# Patient Record
Sex: MALE | Race: WHITE | NOT HISPANIC OR LATINO | Employment: OTHER | URBAN - METROPOLITAN AREA
[De-identification: names, ages, dates, MRNs, and addresses within clinical notes are randomized per-mention and may not be internally consistent; named-entity substitution may affect disease eponyms.]

---

## 2024-09-01 ENCOUNTER — APPOINTMENT (OUTPATIENT)
Dept: CT IMAGING | Facility: HOSPITAL | Age: 79
End: 2024-09-01
Payer: OTHER GOVERNMENT

## 2024-09-01 ENCOUNTER — APPOINTMENT (OUTPATIENT)
Dept: GENERAL RADIOLOGY | Facility: HOSPITAL | Age: 79
End: 2024-09-01
Payer: OTHER GOVERNMENT

## 2024-09-01 ENCOUNTER — HOSPITAL ENCOUNTER (OUTPATIENT)
Facility: HOSPITAL | Age: 79
Setting detail: OBSERVATION
Discharge: HOME | End: 2024-09-03
Attending: EMERGENCY MEDICINE | Admitting: STUDENT IN AN ORGANIZED HEALTH CARE EDUCATION/TRAINING PROGRAM
Payer: OTHER GOVERNMENT

## 2024-09-01 ENCOUNTER — APPOINTMENT (OUTPATIENT)
Dept: CARDIOLOGY | Facility: HOSPITAL | Age: 79
End: 2024-09-01
Payer: OTHER GOVERNMENT

## 2024-09-01 DIAGNOSIS — R53.1 GENERALIZED WEAKNESS: Primary | ICD-10-CM

## 2024-09-01 DIAGNOSIS — R00.1 SINUS BRADYCARDIA: ICD-10-CM

## 2024-09-01 DIAGNOSIS — E87.70 HYPERVOLEMIA, UNSPECIFIED HYPERVOLEMIA TYPE: ICD-10-CM

## 2024-09-01 DIAGNOSIS — R79.89 ELEVATED SERUM CREATININE: ICD-10-CM

## 2024-09-01 DIAGNOSIS — R79.89 ELEVATED TROPONIN: ICD-10-CM

## 2024-09-01 PROBLEM — N18.9 CKD (CHRONIC KIDNEY DISEASE): Status: ACTIVE | Noted: 2024-09-01

## 2024-09-01 PROBLEM — Z93.3 COLOSTOMY IN PLACE: Status: ACTIVE | Noted: 2024-09-01

## 2024-09-01 PROBLEM — I48.0 PAROXYSMAL A-FIB: Status: ACTIVE | Noted: 2024-09-01

## 2024-09-01 PROBLEM — K21.9 GERD (GASTROESOPHAGEAL REFLUX DISEASE): Status: ACTIVE | Noted: 2024-09-01

## 2024-09-01 PROBLEM — I50.9 CHF (CONGESTIVE HEART FAILURE): Status: ACTIVE | Noted: 2024-09-01

## 2024-09-01 PROBLEM — E11.9 TYPE 2 DIABETES MELLITUS: Status: ACTIVE | Noted: 2024-09-01

## 2024-09-01 PROBLEM — H35.30 MACULAR DEGENERATION: Status: ACTIVE | Noted: 2024-09-01

## 2024-09-01 LAB
ALBUMIN SERPL-MCNC: 3.9 G/DL (ref 3.5–5.2)
ALBUMIN/GLOB SERPL: 1.1 G/DL
ALP SERPL-CCNC: 110 U/L (ref 39–117)
ALT SERPL W P-5'-P-CCNC: 8 U/L (ref 1–41)
ANION GAP SERPL CALCULATED.3IONS-SCNC: 10 MMOL/L (ref 5–15)
ANION GAP SERPL CALCULATED.3IONS-SCNC: 12 MMOL/L (ref 5–15)
AORTIC DIMENSIONLESS INDEX: 0.7 (DI)
APTT PPP: 37.7 SECONDS (ref 22.7–35.4)
AST SERPL-CCNC: 19 U/L (ref 1–40)
BACTERIA UR QL AUTO: ABNORMAL /HPF
BASOPHILS # BLD AUTO: 0.05 10*3/MM3 (ref 0–0.2)
BASOPHILS NFR BLD AUTO: 0.9 % (ref 0–1.5)
BH CV ECHO MEAS - AI P1/2T: 667.7 MSEC
BH CV ECHO MEAS - AO MAX PG: 9.5 MMHG
BH CV ECHO MEAS - AO MEAN PG: 4 MMHG
BH CV ECHO MEAS - AO ROOT DIAM: 3.2 CM
BH CV ECHO MEAS - AO V2 MAX: 154 CM/SEC
BH CV ECHO MEAS - AO V2 VTI: 34.3 CM
BH CV ECHO MEAS - AVA(I,D): 2.31 CM2
BH CV ECHO MEAS - EDV(CUBED): 54.9 ML
BH CV ECHO MEAS - EDV(MOD-SP2): 102 ML
BH CV ECHO MEAS - EDV(MOD-SP4): 106 ML
BH CV ECHO MEAS - EF(MOD-BP): 55.5 %
BH CV ECHO MEAS - EF(MOD-SP2): 55.9 %
BH CV ECHO MEAS - EF(MOD-SP4): 57.5 %
BH CV ECHO MEAS - ESV(CUBED): 15.4 ML
BH CV ECHO MEAS - ESV(MOD-SP2): 45 ML
BH CV ECHO MEAS - ESV(MOD-SP4): 45 ML
BH CV ECHO MEAS - FS: 34.6 %
BH CV ECHO MEAS - IVS/LVPW: 1 CM
BH CV ECHO MEAS - IVSD: 1 CM
BH CV ECHO MEAS - LAT PEAK E' VEL: 5.8 CM/SEC
BH CV ECHO MEAS - LV DIASTOLIC VOL/BSA (35-75): 55.3 CM2
BH CV ECHO MEAS - LV MASS(C)D: 117.3 GRAMS
BH CV ECHO MEAS - LV MAX PG: 3.8 MMHG
BH CV ECHO MEAS - LV MEAN PG: 2 MMHG
BH CV ECHO MEAS - LV SYSTOLIC VOL/BSA (12-30): 23.5 CM2
BH CV ECHO MEAS - LV V1 MAX: 98.1 CM/SEC
BH CV ECHO MEAS - LV V1 VTI: 24.4 CM
BH CV ECHO MEAS - LVIDD: 3.8 CM
BH CV ECHO MEAS - LVIDS: 2.49 CM
BH CV ECHO MEAS - LVOT AREA: 3.3 CM2
BH CV ECHO MEAS - LVOT DIAM: 2.03 CM
BH CV ECHO MEAS - LVPWD: 1 CM
BH CV ECHO MEAS - MED PEAK E' VEL: 6.2 CM/SEC
BH CV ECHO MEAS - MR MAX PG: 22 MMHG
BH CV ECHO MEAS - MR MAX VEL: 234.3 CM/SEC
BH CV ECHO MEAS - MV A DUR: 0.08 SEC
BH CV ECHO MEAS - MV A MAX VEL: 40.7 CM/SEC
BH CV ECHO MEAS - MV DEC SLOPE: 519.1 CM/SEC2
BH CV ECHO MEAS - MV DEC TIME: 195 SEC
BH CV ECHO MEAS - MV E MAX VEL: 81.4 CM/SEC
BH CV ECHO MEAS - MV E/A: 2
BH CV ECHO MEAS - MV MAX PG: 4.5 MMHG
BH CV ECHO MEAS - MV MEAN PG: 1.21 MMHG
BH CV ECHO MEAS - MV P1/2T: 61.9 MSEC
BH CV ECHO MEAS - MV V2 VTI: 43.2 CM
BH CV ECHO MEAS - MVA(P1/2T): 3.6 CM2
BH CV ECHO MEAS - MVA(VTI): 1.84 CM2
BH CV ECHO MEAS - PA ACC TIME: 0.11 SEC
BH CV ECHO MEAS - PA V2 MAX: 64.3 CM/SEC
BH CV ECHO MEAS - PULM DIAS VEL: 30.4 CM/SEC
BH CV ECHO MEAS - PULM S/D: 1.2
BH CV ECHO MEAS - PULM SYS VEL: 36.4 CM/SEC
BH CV ECHO MEAS - RAP SYSTOLE: 3 MMHG
BH CV ECHO MEAS - RV MAX PG: 0.83 MMHG
BH CV ECHO MEAS - RV V1 MAX: 45.4 CM/SEC
BH CV ECHO MEAS - RV V1 VTI: 11.6 CM
BH CV ECHO MEAS - RVSP: 28 MMHG
BH CV ECHO MEAS - SV(LVOT): 79.3 ML
BH CV ECHO MEAS - SV(MOD-SP2): 57 ML
BH CV ECHO MEAS - SV(MOD-SP4): 61 ML
BH CV ECHO MEAS - SVI(LVOT): 41.4 ML/M2
BH CV ECHO MEAS - SVI(MOD-SP2): 29.7 ML/M2
BH CV ECHO MEAS - SVI(MOD-SP4): 31.8 ML/M2
BH CV ECHO MEAS - TAPSE (>1.6): 1.38 CM
BH CV ECHO MEAS - TR MAX PG: 25 MMHG
BH CV ECHO MEAS - TR MAX VEL: 249.8 CM/SEC
BH CV ECHO MEASUREMENTS AVERAGE E/E' RATIO: 13.57
BH CV XLRA - RV BASE: 3.8 CM
BH CV XLRA - TDI S': 7.6 CM/SEC
BILIRUB SERPL-MCNC: 1 MG/DL (ref 0–1.2)
BILIRUB UR QL STRIP: NEGATIVE
BUN SERPL-MCNC: 27 MG/DL (ref 8–23)
BUN SERPL-MCNC: 31 MG/DL (ref 8–23)
BUN/CREAT SERPL: 12.1 (ref 7–25)
BUN/CREAT SERPL: 12.5 (ref 7–25)
CALCIUM SPEC-SCNC: 9.3 MG/DL (ref 8.6–10.5)
CALCIUM SPEC-SCNC: 9.4 MG/DL (ref 8.6–10.5)
CHLORIDE SERPL-SCNC: 96 MMOL/L (ref 98–107)
CHLORIDE SERPL-SCNC: 97 MMOL/L (ref 98–107)
CLARITY UR: ABNORMAL
CO2 SERPL-SCNC: 30 MMOL/L (ref 22–29)
CO2 SERPL-SCNC: 30 MMOL/L (ref 22–29)
COLOR UR: YELLOW
CREAT SERPL-MCNC: 2.16 MG/DL (ref 0.76–1.27)
CREAT SERPL-MCNC: 2.56 MG/DL (ref 0.76–1.27)
CREAT UR-MCNC: 56.3 MG/DL
DEPRECATED RDW RBC AUTO: 48.9 FL (ref 37–54)
EGFRCR SERPLBLD CKD-EPI 2021: 25.2 ML/MIN/1.73
EGFRCR SERPLBLD CKD-EPI 2021: 31 ML/MIN/1.73
EOSINOPHIL # BLD AUTO: 0.09 10*3/MM3 (ref 0–0.4)
EOSINOPHIL NFR BLD AUTO: 1.6 % (ref 0.3–6.2)
ERYTHROCYTE [DISTWIDTH] IN BLOOD BY AUTOMATED COUNT: 16.2 % (ref 12.3–15.4)
FERRITIN SERPL-MCNC: 26.8 NG/ML (ref 30–400)
GEN 5 2HR TROPONIN T REFLEX: 110 NG/L
GLOBULIN UR ELPH-MCNC: 3.4 GM/DL
GLUCOSE BLDC GLUCOMTR-MCNC: 123 MG/DL (ref 70–130)
GLUCOSE BLDC GLUCOMTR-MCNC: 127 MG/DL (ref 70–130)
GLUCOSE BLDC GLUCOMTR-MCNC: 180 MG/DL (ref 70–130)
GLUCOSE BLDC GLUCOMTR-MCNC: 99 MG/DL (ref 70–130)
GLUCOSE SERPL-MCNC: 119 MG/DL (ref 65–99)
GLUCOSE SERPL-MCNC: 161 MG/DL (ref 65–99)
GLUCOSE UR STRIP-MCNC: NEGATIVE MG/DL
HCT VFR BLD AUTO: 27.1 % (ref 37.5–51)
HCT VFR BLD AUTO: 27.8 % (ref 37.5–51)
HGB BLD-MCNC: 8.1 G/DL (ref 13–17.7)
HGB BLD-MCNC: 8.2 G/DL (ref 13–17.7)
HGB UR QL STRIP.AUTO: ABNORMAL
HOLD SPECIMEN: NORMAL
HOLD SPECIMEN: NORMAL
HYALINE CASTS UR QL AUTO: ABNORMAL /LPF
IMM GRANULOCYTES # BLD AUTO: 0.03 10*3/MM3 (ref 0–0.05)
IMM GRANULOCYTES NFR BLD AUTO: 0.5 % (ref 0–0.5)
INR PPP: 2.32 (ref 0.9–1.1)
IRON 24H UR-MRATE: 22 MCG/DL (ref 59–158)
IRON SATN MFR SERPL: 5 % (ref 20–50)
KETONES UR QL STRIP: NEGATIVE
LEFT ATRIUM VOLUME INDEX: 42.7 ML/M2
LEUKOCYTE ESTERASE UR QL STRIP.AUTO: ABNORMAL
LYMPHOCYTES # BLD AUTO: 0.93 10*3/MM3 (ref 0.7–3.1)
LYMPHOCYTES NFR BLD AUTO: 16.1 % (ref 19.6–45.3)
MAGNESIUM SERPL-MCNC: 2.4 MG/DL (ref 1.6–2.4)
MCH RBC QN AUTO: 24.2 PG (ref 26.6–33)
MCHC RBC AUTO-ENTMCNC: 29.1 G/DL (ref 31.5–35.7)
MCV RBC AUTO: 83 FL (ref 79–97)
MONOCYTES # BLD AUTO: 0.58 10*3/MM3 (ref 0.1–0.9)
MONOCYTES NFR BLD AUTO: 10 % (ref 5–12)
NEUTROPHILS NFR BLD AUTO: 4.11 10*3/MM3 (ref 1.7–7)
NEUTROPHILS NFR BLD AUTO: 70.9 % (ref 42.7–76)
NITRITE UR QL STRIP: NEGATIVE
NRBC BLD AUTO-RTO: 0 /100 WBC (ref 0–0.2)
NT-PROBNP SERPL-MCNC: 2703 PG/ML (ref 0–1800)
PH UR STRIP.AUTO: 6.5 [PH] (ref 5–8)
PLATELET # BLD AUTO: 342 10*3/MM3 (ref 140–450)
PMV BLD AUTO: 9.1 FL (ref 6–12)
POTASSIUM SERPL-SCNC: 4 MMOL/L (ref 3.5–5.2)
POTASSIUM SERPL-SCNC: 4.3 MMOL/L (ref 3.5–5.2)
PROT ?TM UR-MCNC: 40.5 MG/DL
PROT SERPL-MCNC: 7.3 G/DL (ref 6–8.5)
PROT UR QL STRIP: ABNORMAL
PROT/CREAT UR: 719.4 MG/G CREA (ref 0–200)
PROTHROMBIN TIME: 25.8 SECONDS (ref 11.7–14.2)
QT INTERVAL: 463 MS
QT INTERVAL: 583 MS
QTC INTERVAL: 414 MS
QTC INTERVAL: 470 MS
RBC # BLD AUTO: 3.35 10*6/MM3 (ref 4.14–5.8)
RBC # UR STRIP: ABNORMAL /HPF
REF LAB TEST METHOD: ABNORMAL
SODIUM SERPL-SCNC: 137 MMOL/L (ref 136–145)
SODIUM SERPL-SCNC: 138 MMOL/L (ref 136–145)
SODIUM UR-SCNC: 77 MMOL/L
SP GR UR STRIP: 1.01 (ref 1–1.03)
SQUAMOUS #/AREA URNS HPF: ABNORMAL /HPF
T4 FREE SERPL-MCNC: 1.23 NG/DL (ref 0.92–1.68)
TIBC SERPL-MCNC: 448 MCG/DL (ref 298–536)
TRANSFERRIN SERPL-MCNC: 301 MG/DL (ref 200–360)
TROPONIN T DELTA: -18 NG/L
TROPONIN T SERPL HS-MCNC: 128 NG/L
TSH SERPL DL<=0.05 MIU/L-ACNC: 7.25 UIU/ML (ref 0.27–4.2)
UROBILINOGEN UR QL STRIP: ABNORMAL
UUN 24H UR-MCNC: 222 MG/DL
WBC # UR STRIP: ABNORMAL /HPF
WBC NRBC COR # BLD AUTO: 5.79 10*3/MM3 (ref 3.4–10.8)
WHOLE BLOOD HOLD COAG: NORMAL
WHOLE BLOOD HOLD SPECIMEN: NORMAL

## 2024-09-01 PROCEDURE — G0378 HOSPITAL OBSERVATION PER HR: HCPCS

## 2024-09-01 PROCEDURE — 25510000001 PERFLUTREN 6.52 MG/ML SUSPENSION 2 ML VIAL: Performed by: STUDENT IN AN ORGANIZED HEALTH CARE EDUCATION/TRAINING PROGRAM

## 2024-09-01 PROCEDURE — 99285 EMERGENCY DEPT VISIT HI MDM: CPT

## 2024-09-01 PROCEDURE — 87088 URINE BACTERIA CULTURE: CPT | Performed by: STUDENT IN AN ORGANIZED HEALTH CARE EDUCATION/TRAINING PROGRAM

## 2024-09-01 PROCEDURE — 81001 URINALYSIS AUTO W/SCOPE: CPT | Performed by: STUDENT IN AN ORGANIZED HEALTH CARE EDUCATION/TRAINING PROGRAM

## 2024-09-01 PROCEDURE — 83735 ASSAY OF MAGNESIUM: CPT | Performed by: PHYSICIAN ASSISTANT

## 2024-09-01 PROCEDURE — 87086 URINE CULTURE/COLONY COUNT: CPT | Performed by: STUDENT IN AN ORGANIZED HEALTH CARE EDUCATION/TRAINING PROGRAM

## 2024-09-01 PROCEDURE — 82948 REAGENT STRIP/BLOOD GLUCOSE: CPT

## 2024-09-01 PROCEDURE — 93005 ELECTROCARDIOGRAM TRACING: CPT | Performed by: EMERGENCY MEDICINE

## 2024-09-01 PROCEDURE — 84439 ASSAY OF FREE THYROXINE: CPT | Performed by: STUDENT IN AN ORGANIZED HEALTH CARE EDUCATION/TRAINING PROGRAM

## 2024-09-01 PROCEDURE — 93306 TTE W/DOPPLER COMPLETE: CPT

## 2024-09-01 PROCEDURE — 84484 ASSAY OF TROPONIN QUANT: CPT | Performed by: PHYSICIAN ASSISTANT

## 2024-09-01 PROCEDURE — 83880 ASSAY OF NATRIURETIC PEPTIDE: CPT | Performed by: PHYSICIAN ASSISTANT

## 2024-09-01 PROCEDURE — 84300 ASSAY OF URINE SODIUM: CPT | Performed by: STUDENT IN AN ORGANIZED HEALTH CARE EDUCATION/TRAINING PROGRAM

## 2024-09-01 PROCEDURE — 71045 X-RAY EXAM CHEST 1 VIEW: CPT

## 2024-09-01 PROCEDURE — 25810000003 SODIUM CHLORIDE 0.9 % SOLUTION: Performed by: STUDENT IN AN ORGANIZED HEALTH CARE EDUCATION/TRAINING PROGRAM

## 2024-09-01 PROCEDURE — 85730 THROMBOPLASTIN TIME PARTIAL: CPT | Performed by: PHYSICIAN ASSISTANT

## 2024-09-01 PROCEDURE — 82570 ASSAY OF URINE CREATININE: CPT | Performed by: STUDENT IN AN ORGANIZED HEALTH CARE EDUCATION/TRAINING PROGRAM

## 2024-09-01 PROCEDURE — 83540 ASSAY OF IRON: CPT | Performed by: STUDENT IN AN ORGANIZED HEALTH CARE EDUCATION/TRAINING PROGRAM

## 2024-09-01 PROCEDURE — 84466 ASSAY OF TRANSFERRIN: CPT | Performed by: STUDENT IN AN ORGANIZED HEALTH CARE EDUCATION/TRAINING PROGRAM

## 2024-09-01 PROCEDURE — 70450 CT HEAD/BRAIN W/O DYE: CPT

## 2024-09-01 PROCEDURE — 36415 COLL VENOUS BLD VENIPUNCTURE: CPT

## 2024-09-01 PROCEDURE — 84156 ASSAY OF PROTEIN URINE: CPT | Performed by: STUDENT IN AN ORGANIZED HEALTH CARE EDUCATION/TRAINING PROGRAM

## 2024-09-01 PROCEDURE — 85610 PROTHROMBIN TIME: CPT | Performed by: PHYSICIAN ASSISTANT

## 2024-09-01 PROCEDURE — 82728 ASSAY OF FERRITIN: CPT | Performed by: STUDENT IN AN ORGANIZED HEALTH CARE EDUCATION/TRAINING PROGRAM

## 2024-09-01 PROCEDURE — 85025 COMPLETE CBC W/AUTO DIFF WBC: CPT | Performed by: PHYSICIAN ASSISTANT

## 2024-09-01 PROCEDURE — 84443 ASSAY THYROID STIM HORMONE: CPT | Performed by: STUDENT IN AN ORGANIZED HEALTH CARE EDUCATION/TRAINING PROGRAM

## 2024-09-01 PROCEDURE — 93010 ELECTROCARDIOGRAM REPORT: CPT | Performed by: INTERNAL MEDICINE

## 2024-09-01 PROCEDURE — 84540 ASSAY OF URINE/UREA-N: CPT | Performed by: STUDENT IN AN ORGANIZED HEALTH CARE EDUCATION/TRAINING PROGRAM

## 2024-09-01 PROCEDURE — 93306 TTE W/DOPPLER COMPLETE: CPT | Performed by: INTERNAL MEDICINE

## 2024-09-01 PROCEDURE — 93005 ELECTROCARDIOGRAM TRACING: CPT | Performed by: STUDENT IN AN ORGANIZED HEALTH CARE EDUCATION/TRAINING PROGRAM

## 2024-09-01 PROCEDURE — 85018 HEMOGLOBIN: CPT | Performed by: STUDENT IN AN ORGANIZED HEALTH CARE EDUCATION/TRAINING PROGRAM

## 2024-09-01 PROCEDURE — 87186 SC STD MICRODIL/AGAR DIL: CPT | Performed by: STUDENT IN AN ORGANIZED HEALTH CARE EDUCATION/TRAINING PROGRAM

## 2024-09-01 PROCEDURE — 25010000002 NA FERRIC GLUC CPLX PER 12.5 MG: Performed by: STUDENT IN AN ORGANIZED HEALTH CARE EDUCATION/TRAINING PROGRAM

## 2024-09-01 PROCEDURE — 80053 COMPREHEN METABOLIC PANEL: CPT | Performed by: PHYSICIAN ASSISTANT

## 2024-09-01 PROCEDURE — 85014 HEMATOCRIT: CPT | Performed by: STUDENT IN AN ORGANIZED HEALTH CARE EDUCATION/TRAINING PROGRAM

## 2024-09-01 PROCEDURE — 93005 ELECTROCARDIOGRAM TRACING: CPT

## 2024-09-01 RX ORDER — GABAPENTIN 300 MG/1
300 CAPSULE ORAL 3 TIMES DAILY
Status: DISCONTINUED | OUTPATIENT
Start: 2024-09-01 | End: 2024-09-03 | Stop reason: HOSPADM

## 2024-09-01 RX ORDER — BISACODYL 5 MG/1
5 TABLET, DELAYED RELEASE ORAL DAILY PRN
Status: DISCONTINUED | OUTPATIENT
Start: 2024-09-01 | End: 2024-09-03 | Stop reason: HOSPADM

## 2024-09-01 RX ORDER — ACETAMINOPHEN 325 MG/1
650 TABLET ORAL ONCE
Status: COMPLETED | OUTPATIENT
Start: 2024-09-01 | End: 2024-09-01

## 2024-09-01 RX ORDER — FINASTERIDE 5 MG/1
5 TABLET, FILM COATED ORAL DAILY
Status: DISCONTINUED | OUTPATIENT
Start: 2024-09-01 | End: 2024-09-03 | Stop reason: HOSPADM

## 2024-09-01 RX ORDER — TRAMADOL HYDROCHLORIDE 50 MG/1
50 TABLET ORAL EVERY 8 HOURS PRN
COMMUNITY

## 2024-09-01 RX ORDER — AMOXICILLIN 250 MG
2 CAPSULE ORAL 2 TIMES DAILY PRN
Status: DISCONTINUED | OUTPATIENT
Start: 2024-09-01 | End: 2024-09-03 | Stop reason: HOSPADM

## 2024-09-01 RX ORDER — ONDANSETRON 4 MG/1
4 TABLET, ORALLY DISINTEGRATING ORAL EVERY 6 HOURS PRN
Status: DISCONTINUED | OUTPATIENT
Start: 2024-09-01 | End: 2024-09-03 | Stop reason: HOSPADM

## 2024-09-01 RX ORDER — SCOLOPAMINE TRANSDERMAL SYSTEM 1 MG/1
1 PATCH, EXTENDED RELEASE TRANSDERMAL
Status: DISCONTINUED | OUTPATIENT
Start: 2024-09-01 | End: 2024-09-03 | Stop reason: HOSPADM

## 2024-09-01 RX ORDER — ONDANSETRON 2 MG/ML
4 INJECTION INTRAMUSCULAR; INTRAVENOUS EVERY 6 HOURS PRN
Status: DISCONTINUED | OUTPATIENT
Start: 2024-09-01 | End: 2024-09-03 | Stop reason: HOSPADM

## 2024-09-01 RX ORDER — BUMETANIDE 2 MG/1
2 TABLET ORAL DAILY
Status: DISCONTINUED | OUTPATIENT
Start: 2024-09-01 | End: 2024-09-03 | Stop reason: HOSPADM

## 2024-09-01 RX ORDER — NICOTINE POLACRILEX 4 MG
15 LOZENGE BUCCAL
Status: DISCONTINUED | OUTPATIENT
Start: 2024-09-01 | End: 2024-09-03 | Stop reason: HOSPADM

## 2024-09-01 RX ORDER — GABAPENTIN 300 MG/1
300 CAPSULE ORAL 3 TIMES DAILY
COMMUNITY

## 2024-09-01 RX ORDER — FINASTERIDE 5 MG/1
5 TABLET, FILM COATED ORAL DAILY
COMMUNITY

## 2024-09-01 RX ORDER — FUROSEMIDE 10 MG/ML
40 INJECTION INTRAMUSCULAR; INTRAVENOUS ONCE
Status: DISCONTINUED | OUTPATIENT
Start: 2024-09-01 | End: 2024-09-01

## 2024-09-01 RX ORDER — BISACODYL 10 MG
10 SUPPOSITORY, RECTAL RECTAL DAILY PRN
Status: DISCONTINUED | OUTPATIENT
Start: 2024-09-01 | End: 2024-09-03 | Stop reason: HOSPADM

## 2024-09-01 RX ORDER — INSULIN LISPRO 100 [IU]/ML
2-7 INJECTION, SOLUTION INTRAVENOUS; SUBCUTANEOUS
Status: DISCONTINUED | OUTPATIENT
Start: 2024-09-01 | End: 2024-09-03 | Stop reason: HOSPADM

## 2024-09-01 RX ORDER — NITROGLYCERIN 0.4 MG/1
0.4 TABLET SUBLINGUAL
Status: DISCONTINUED | OUTPATIENT
Start: 2024-09-01 | End: 2024-09-03 | Stop reason: HOSPADM

## 2024-09-01 RX ORDER — FUROSEMIDE 10 MG/ML
80 INJECTION INTRAMUSCULAR; INTRAVENOUS ONCE
Status: DISCONTINUED | OUTPATIENT
Start: 2024-09-01 | End: 2024-09-01

## 2024-09-01 RX ORDER — POLYETHYLENE GLYCOL 3350 17 G/17G
17 POWDER, FOR SOLUTION ORAL DAILY PRN
Status: DISCONTINUED | OUTPATIENT
Start: 2024-09-01 | End: 2024-09-03 | Stop reason: HOSPADM

## 2024-09-01 RX ORDER — IBUPROFEN 600 MG/1
1 TABLET ORAL
Status: DISCONTINUED | OUTPATIENT
Start: 2024-09-01 | End: 2024-09-03 | Stop reason: HOSPADM

## 2024-09-01 RX ORDER — SODIUM CHLORIDE 0.9 % (FLUSH) 0.9 %
10 SYRINGE (ML) INJECTION AS NEEDED
Status: DISCONTINUED | OUTPATIENT
Start: 2024-09-01 | End: 2024-09-03 | Stop reason: HOSPADM

## 2024-09-01 RX ORDER — SCOLOPAMINE TRANSDERMAL SYSTEM 1 MG/1
1 PATCH, EXTENDED RELEASE TRANSDERMAL
COMMUNITY

## 2024-09-01 RX ORDER — CETIRIZINE HYDROCHLORIDE 10 MG/1
5 TABLET ORAL ONCE
Status: COMPLETED | OUTPATIENT
Start: 2024-09-01 | End: 2024-09-01

## 2024-09-01 RX ORDER — BUMETANIDE 2 MG/1
2 TABLET ORAL DAILY
COMMUNITY

## 2024-09-01 RX ORDER — TRAMADOL HYDROCHLORIDE 50 MG/1
50 TABLET ORAL EVERY 8 HOURS PRN
Status: DISCONTINUED | OUTPATIENT
Start: 2024-09-01 | End: 2024-09-03 | Stop reason: HOSPADM

## 2024-09-01 RX ORDER — DEXTROSE MONOHYDRATE 25 G/50ML
25 INJECTION, SOLUTION INTRAVENOUS
Status: DISCONTINUED | OUTPATIENT
Start: 2024-09-01 | End: 2024-09-03 | Stop reason: HOSPADM

## 2024-09-01 RX ADMIN — RIVAROXABAN 15 MG: 15 TABLET, FILM COATED ORAL at 18:05

## 2024-09-01 RX ADMIN — CETIRIZINE HYDROCHLORIDE 5 MG: 10 TABLET ORAL at 16:04

## 2024-09-01 RX ADMIN — GABAPENTIN 300 MG: 300 CAPSULE ORAL at 16:05

## 2024-09-01 RX ADMIN — PERFLUTREN 2 ML: 6.52 INJECTION, SUSPENSION INTRAVENOUS at 14:46

## 2024-09-01 RX ADMIN — ACETAMINOPHEN 325MG 650 MG: 325 TABLET ORAL at 16:04

## 2024-09-01 RX ADMIN — GABAPENTIN 300 MG: 300 CAPSULE ORAL at 21:11

## 2024-09-01 RX ADMIN — FINASTERIDE 5 MG: 5 TABLET, FILM COATED ORAL at 16:05

## 2024-09-01 RX ADMIN — SODIUM CHLORIDE 250 MG: 9 INJECTION, SOLUTION INTRAVENOUS at 16:29

## 2024-09-01 NOTE — H&P
Patient Name:  Huber Valdovinos  YOB: 1945  MRN:  [id number]  Admit Date:  9/1/2024  Patient Care Team:  Luis Slaughter MD as PCP - General (Family Medicine)      Subjective   History Present Illness     Chief Complaint   Patient presents with    Weakness - Generalized       Mr. Valdovinos is a 78 y.o. man with hypertension, type 2 diabetes with neuropathy, chronic afib on xarelto, CHF, GERD, CKD, macular degeneration, history of some kind of GI-percutaneous fistula, a history of diverticulitis requiring a partial colectomy with colostomy placement [facility] He is a very poor historian and gets upset whenever I ask him a question. He tells me that yesterday he was having trouble controlling the direction of his feet and that he was having reduced hand  strength. He also tells me that this has been ongoing for 8 months. He went to his girlfriend's apartment this morning (they live in the same building) and had her call 911. When EMS found him, he was noted to be bradycardic and so he was brought here. He was noted to have an elevated creatinine, but he does have CKD and doesn't know his baseline. He was also noted to have significant anemia with a hgb of 8, but he says that 6 months ago, his hgb was either 8 or 9. Currently he has no acute complaints. He says that his lower extremity swelling is at baseline and his shortness of breath is at baseline. He denies any chest discomfort or worsening cough. He does endorse some dysuria and palpitations.     History of Present Illness  Review of Systems   Constitutional:  Negative for chills, diaphoresis and fever.   HENT:  Negative for postnasal drip and rhinorrhea.    Eyes: Negative.    Respiratory:  Positive for shortness of breath. Negative for cough.    Cardiovascular:  Positive for palpitations. Negative for chest pain and leg swelling.   Gastrointestinal:  Negative for abdominal pain, constipation, diarrhea,  nausea and vomiting.   Endocrine: Negative.    Genitourinary:  Positive for dysuria, frequency and urgency. Negative for flank pain.   Musculoskeletal:  Negative for arthralgias and myalgias.   Skin:  Negative for rash and wound.   Allergic/Immunologic: Negative.    Neurological:  Positive for weakness. Negative for light-headedness and headaches.   Hematological: Negative.    Psychiatric/Behavioral:  Negative for confusion and decreased concentration.         Personal History     No past medical history on file.  No past surgical history on file.  No family history on file.  Social History     Tobacco Use    Smoking status: Every Day     Current packs/day: 0.50     Types: Cigarettes    Smokeless tobacco: Never   Substance Use Topics    Drug use: Never     No current facility-administered medications on file prior to encounter.     Current Outpatient Medications on File Prior to Encounter   Medication Sig Dispense Refill    bumetanide (BUMEX) 2 MG tablet Take 1 tablet by mouth Daily.      finasteride (PROSCAR) 5 MG tablet Take 1 tablet by mouth Daily.      gabapentin (NEURONTIN) 300 MG capsule Take 1 capsule by mouth 3 (Three) Times a Day.      rivaroxaban (XARELTO) 15 MG tablet Take 1 tablet by mouth Daily.      Scopolamine 1 MG/3DAYS patch Place 1 patch on the skin as directed by provider Every 72 (Seventy-Two) Hours.      tiZANidine (ZANAFLEX) 4 MG tablet Take 1 tablet by mouth At Night As Needed for Muscle Spasms.      traMADol (ULTRAM) 50 MG tablet Take 1 tablet by mouth Every 8 (Eight) Hours As Needed for Moderate Pain.       Allergies   Allergen Reactions    Oxycodone Itching       Objective    Objective     Vital Signs  Temp:  [97.9 °F (36.6 °C)-98 °F (36.7 °C)] 97.9 °F (36.6 °C)  Heart Rate:  [38-44] 44  Resp:  [22-23] 22  BP: ()/(56-58) 98/58  SpO2:  [98 %] 98 %  on   ;   Device (Oxygen Therapy): room air  Body mass index is 29.32 kg/m².    Physical Exam  Vitals and nursing note reviewed.    Constitutional:       General: He is not in acute distress.     Appearance: He is not toxic-appearing.   HENT:      Head: Normocephalic and atraumatic.      Mouth/Throat:      Mouth: Mucous membranes are moist.      Pharynx: Oropharynx is clear. No oropharyngeal exudate.   Eyes:      Extraocular Movements: Extraocular movements intact.      Conjunctiva/sclera: Conjunctivae normal.      Pupils: Pupils are equal, round, and reactive to light.   Cardiovascular:      Rate and Rhythm: Regular rhythm. Bradycardia present.      Heart sounds: Normal heart sounds.   Pulmonary:      Effort: Pulmonary effort is normal. No respiratory distress.      Breath sounds: Normal breath sounds. No wheezing, rhonchi or rales.   Abdominal:      General: Bowel sounds are normal. There is no distension.      Palpations: Abdomen is soft.      Tenderness: There is no abdominal tenderness. There is no guarding or rebound.      Comments: Large midline scar, left sided colostomy   Musculoskeletal:         General: No tenderness.      Cervical back: Normal range of motion and neck supple.      Right lower leg: Edema present.      Left lower leg: Edema present.   Skin:     General: Skin is warm and dry.      Findings: No erythema or rash.   Neurological:      General: No focal deficit present.      Mental Status: He is alert and oriented to person, place, and time.   Psychiatric:         Mood and Affect: Mood normal.         Behavior: Behavior normal.         Results Review:  I reviewed the patient's new clinical results.  I reviewed the patient's new imaging results and agree with the interpretation.  I reviewed the patient's other test results and agree with the interpretation  I personally viewed and interpreted the patient's EKG/Telemetry data  Discussed with ED provider.    Lab Results (last 24 hours)       Procedure Component Value Units Date/Time    CBC & Differential [[id number]]  (Abnormal) Collected: 09/01/24 0710    Specimen: Blood  Updated: 09/01/24 0759    Narrative:      The following orders were created for panel order CBC & Differential.  Procedure                               Abnormality         Status                     ---------                               -----------         ------                     CBC Auto Differential[[id number]]        Abnormal            Final result                 Please view results for these tests on the individual orders.    Comprehensive Metabolic Panel [[id number]]  (Abnormal) Collected: 09/01/24 0710    Specimen: Blood Updated: 09/01/24 0743     Glucose 161 mg/dL      BUN 27 mg/dL      Creatinine 2.16 mg/dL      Sodium 137 mmol/L      Potassium 4.3 mmol/L      Comment: Slight hemolysis detected by analyzer. Result may be falsely elevated.        Chloride 97 mmol/L      CO2 30.0 mmol/L      Calcium 9.4 mg/dL      Total Protein 7.3 g/dL      Albumin 3.9 g/dL      ALT (SGPT) 8 U/L      AST (SGOT) 19 U/L      Alkaline Phosphatase 110 U/L      Total Bilirubin 1.0 mg/dL      Globulin 3.4 gm/dL      A/G Ratio 1.1 g/dL      BUN/Creatinine Ratio 12.5     Anion Gap 10.0 mmol/L      eGFR 31.0 mL/min/1.73     Narrative:      GFR Normal >60  Chronic Kidney Disease <60  Kidney Failure <15    The GFR formula is only valid for adults with stable renal function between ages 18 and 70.    Protime-INR [[id number]]  (Abnormal) Collected: 09/01/24 0710    Specimen: Blood Updated: 09/01/24 0730     Protime 25.8 Seconds      INR 2.32    aPTT [[id number]]  (Abnormal) Collected: 09/01/24 0710    Specimen: Blood Updated: 09/01/24 0730     PTT 37.7 seconds     BNP [[id number]]  (Abnormal) Collected: 09/01/24 0710    Specimen: Blood Updated: 09/01/24 0743     proBNP 2,703.0 pg/mL     Narrative:      This assay is used as an aid in the diagnosis of individuals suspected of having heart failure. It can be used as an aid in the diagnosis of acute decompensated heart failure (ADHF) in patients presenting with signs and symptoms of ADHF  to the emergency department (ED). In addition, NT-proBNP of <300 pg/mL indicates ADHF is not likely.    Age Range Result Interpretation  NT-proBNP Concentration (pg/mL:      <50             Positive            >450                   Gray                 300-450                    Negative             <300    50-75           Positive            >900                  Gray                300-900                  Negative            <300      >75             Positive            >1800                  Gray                300-1800                  Negative            <300    Single High Sensitivity Troponin T [[id number]]  (Abnormal) Collected: 09/01/24 0710    Specimen: Blood Updated: 09/01/24 0800     HS Troponin T 128 ng/L     Narrative:      High Sensitive Troponin T Reference Range:  <14.0 ng/L- Negative Female for AMI  <22.0 ng/L- Negative Male for AMI  >=14 - Abnormal Female indicating possible myocardial injury.  >=22 - Abnormal Male indicating possible myocardial injury.   Clinicians would have to utilize clinical acumen, EKG, Troponin, and serial changes to determine if it is an Acute Myocardial Infarction or myocardial injury due to an underlying chronic condition.         Magnesium [[id number]]  (Normal) Collected: 09/01/24 0710    Specimen: Blood Updated: 09/01/24 0743     Magnesium 2.4 mg/dL     CBC Auto Differential [[id number]]  (Abnormal) Collected: 09/01/24 0710    Specimen: Blood Updated: 09/01/24 0759     WBC 5.79 10*3/mm3      RBC 3.35 10*6/mm3      Hemoglobin 8.1 g/dL      Hematocrit 27.8 %      MCV 83.0 fL      MCH 24.2 pg      MCHC 29.1 g/dL      RDW 16.2 %      RDW-SD 48.9 fl      MPV 9.1 fL      Platelets 342 10*3/mm3      Neutrophil % 70.9 %      Lymphocyte % 16.1 %      Monocyte % 10.0 %      Eosinophil % 1.6 %      Basophil % 0.9 %      Immature Grans % 0.5 %      Neutrophils, Absolute 4.11 10*3/mm3      Lymphocytes, Absolute 0.93 10*3/mm3      Monocytes, Absolute 0.58 10*3/mm3      Eosinophils,  Absolute 0.09 10*3/mm3      Basophils, Absolute 0.05 10*3/mm3      Immature Grans, Absolute 0.03 10*3/mm3      nRBC 0.0 /100 WBC     POC Glucose Once [[id number]]  (Abnormal) Collected: 09/01/24 0719    Specimen: Blood Updated: 09/01/24 0721     Glucose 180 mg/dL     High Sensitivity Troponin T 2Hr [[id number]]  (Abnormal) Collected: 09/01/24 1012    Specimen: Blood Updated: 09/01/24 1101     HS Troponin T 110 ng/L      Troponin T Delta -18 ng/L     Narrative:      High Sensitive Troponin T Reference Range:  <14.0 ng/L- Negative Female for AMI  <22.0 ng/L- Negative Male for AMI  >=14 - Abnormal Female indicating possible myocardial injury.  >=22 - Abnormal Male indicating possible myocardial injury.   Clinicians would have to utilize clinical acumen, EKG, Troponin, and serial changes to determine if it is an Acute Myocardial Infarction or myocardial injury due to an underlying chronic condition.                 Imaging Results (Last 24 Hours)       Procedure Component Value Units Date/Time    CT Head Without Contrast [[id number]] Collected: 09/01/24 0839     Updated: 09/01/24 0845    Narrative:      CT HEAD WO CONTRAST-     DATE OF EXAM: 9/1/2024 8:21 AM     INDICATION: Generalized weakness.     COMPARISON: None available.     TECHNIQUE: Multiple contiguous axial images were acquired through the  head without the intravenous administration of contrast. Reformatted  coronal and sagittal sequences were also reviewed. Radiation dose  reduction techniques were utilized, including automated exposure control  and exposure modulation based on body size.     FINDINGS:  No acute intracranial hemorrhage or mass/mass effect. The ventricles and  sulci are appropriate for age. The basilar cisterns are patent. Patchy  periventricular and subcortical white matter low-attenuation,  statistically representing age-indeterminate small vessel ischemic  changes. Gray-white matter differentiation is otherwise grossly  maintained. Likely  benign focal calcification in the brent. Mild mucosal  thickening in the partially imaged left maxillary sinus. Limited imaging  of the orbits and mastoid air cells is unremarkable. No acute osseous  abnormality is identified.       Impression:         1. No acute intracranial hemorrhage or mass/mass effect.  2. Patchy periventricular and subcortical white matter low-attenuation,  statistically representing age-indeterminate small vessel ischemic  changes.  3. Likely benign focal calcification in the brent. Could consider further  evaluation with MRI if there is persistent clinical concern.     This report was finalized on 9/1/2024 8:42 AM by Abimael Rivera MD on  Workstation: [id number]       XR Chest 1 View [[id number]] Collected: 09/01/24 0752     Updated: 09/01/24 0757    Narrative:      XR CHEST 1 VW-     DATE OF EXAM: 9/1/2024 7:22 AM     INDICATION: generalized weakness.     COMPARISON: None available.     TECHNIQUE: A single portable AP view of the chest was obtained.     FINDINGS:  Lordotic positioning. Overlying artifact. Low lung volumes with  ill-defined right greater than left predominantly perihilar and basilar  opacities in both lungs, obscuring the right hemidiaphragm and each  costophrenic angle. Tiny nodules in both lungs likely represent tiny  calcified granulomas. No pneumothorax. Enlargement of the cardiac  silhouette, likely accentuated by technique. No acute osseous  abnormality is identified.       Impression:         1. Low lung volumes with ill-defined right greater than left predominant  perihilar and basilar opacities in both lungs, which could represent  small bilateral pleural effusions with underlying atelectasis and/or  pneumonia and possible superimposed mild pulmonary edema.  2. Cardiomegaly, likely accentuated by technique.     This report was finalized on 9/1/2024 7:54 AM by Abimael Rivera MD on  Workstation: [id number]                   ECG 12 Lead Bradycardia   Preliminary Result   HEART  RATE=39  bpm   RR Ceojzsfs=0641  ms   DE Ddxebjtx=362  ms   P Horizontal Axis=239  deg   P Front Axis=256  deg   QRSD Ojngmmni=277  ms   QT Xghiktib=070  ms   ANwD=815  ms   QRS Axis=-42  deg   T Wave Axis=241  deg   - ABNORMAL ECG -   Sinus or ectopic atrial bradycardia   Nonspecific IVCD with LAD   Abnormal lateral Q waves   Borderline T abnormalities, inferior leads   Date and Time of Study:2024-09-01 11:03:46      ECG 12 Lead Bradycardia   Preliminary Result   HEART RATE=48  bpm   RR Sscyysrz=1354  ms   DE Zaxiqixi=314  ms   P Horizontal Axis=59  deg   P Front Axis=17  deg   QRSD Cqpwuzwv=515  ms   QT Ndcmchkv=144  ms   LDzR=628  ms   QRS Axis=-40  deg   T Wave Axis=179  deg   - ABNORMAL ECG -   Sinus bradycardia   Left bundle branch block   Date and Time of Study:2024-09-01 07:09:06           Assessment/Plan     Active Hospital Problems    Diagnosis  POA    **Generalized weakness [R53.1]  Yes    CHF (congestive heart failure) [I50.9]  Yes    CKD (chronic kidney disease) [N18.9]  Yes    Type 2 diabetes mellitus [E11.9]  Yes    GERD (gastroesophageal reflux disease) [K21.9]  Yes    Paroxysmal A-fib [I48.0]  Yes    Colostomy in place [Z93.3]  Not Applicable    Macular degeneration [H35.30]  Yes      Resolved Hospital Problems   No resolved problems to display.       Mr. Valdovinos is a 78 y.o. 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 He presents with generalized weakness, bradycardia, anemia, elevated creatinine, and pulm edema on chest xray.     Sinus bradycardia-check a tsh and echocardiogram. Ask  cardiology to consult. Hold any AV dallas blockade  Chronic diastolic heart failure-difficult to tell if there's an acute component. There appear to be some small effusions on chest xray and some edema, but some of this could also be as interstitial lung disease. He endorses some shortness of breath, but he says he is always short of breath and there is no PND and he doesn't ever lie flat to assess for orthopnea. He also denies any weight gain or increased lower extremity swelling. Check an echo as above, and just going to continue with his home Bumex for now  CKD 3B-creatinine appears to be at baseline  Anemia-hemoglobin was about 8.8 back in March. No recent EGD (last time was 2-3 years ago). He reports that his stool has been darker, but it doesn't really sound like he has clinical bleeding. Will check iron studies, b12, folate, and monitor his hgb.   Type 2 diabetes-check an A1c and start sliding scale  [facility] and reviewed.  Restart home medications as appropriate once reconciled  Generalized weakness-multiple potential causes including anemia, bradycardia, DIANE, heart failure  I discussed the patient's findings and my recommendations with patient and ED provider.    VTE Prophylaxis - Xarelto (home med).  Code Status - Full code.       Vargas Cuello MD  [facility]  09/01/24  12:00 EDT

## 2024-09-01 NOTE — Clinical Note
Level of Care: Telemetry [5]   Diagnosis: Generalized weakness [913645]   Admitting Physician: RAQUEL MUNOZ [[id number]]   Attending Physician: RAQUEL MUNOZ [[id number]]

## 2024-09-01 NOTE — PLAN OF CARE
Goal Outcome Evaluation:         Patient arrived from ER this shift. Very hard of hearing. Has been hypotensive,bradycardic, sugars well controlled, AO4, 3L NC. ECHO completed earlier this afternoon. Iron infusion is currently ongoing. Is resting comfortably. Will continue to monitor.

## 2024-09-01 NOTE — ED PROVIDER NOTES
EMERGENCY DEPARTMENT MD ATTESTATION NOTE    Room Number:  08/08  PCP: Luis Slaughter MD  Independent Historians: Patient and EMS    HPI:  A complete HPI/ROS/PMH/PSH/SH/FH are unobtainable due to: Poor historian    Chronic or social conditions impacting patient care (Social Determinants of Health): None      Context: Huber Valdovnios is a 78 y.o. male with a medical history of hypertension, diabetes, atrial fibrillation and GERD who presents to the ED c/o acute generalized weakness.  History is really quite limited because the patient is not a strong independent historian.  He denies chest pain.  He denies difficulties breathing.  He denies abdominal pain or nausea.  Unknown if he has had any fevers or recent sick contacts.  Paramedics found the patient to have some bradycardia and route to the hospital.      Review of prior external notes (non-ED) -and- Review of prior external test results outside of this encounter: I independently reviewed the remote internal medicine progress note from March 5, 2014 when patient was evaluated for abdominal pain symptoms.  Advised to follow-up with general surgeon.    Prescription drug monitoring program review:     N/A and LYNN query complete and reviewed. Patient receives regular prescriptions for controlled substances.      PHYSICAL EXAM    I have reviewed the triage vital signs and nursing notes.    ED Triage Vitals [09/01/24 0704]   Temp Heart Rate Resp BP SpO2   98 °F (36.7 °C) (!) 41 23 121/56 98 %      Temp src Heart Rate Source Patient Position BP Location FiO2 (%)   Tympanic Monitor -- -- --       Physical Exam  GENERAL: alert, appears fatigued but no sign of distress  SKIN: Warm, dry, no rashes, mild pallor  HENT: Normocephalic, atraumatic  EYES: no scleral icterus  CV: regular rhythm, bradycardic rate  RESPIRATORY: normal effort, lungs clear, no stridor  ABDOMEN: soft, nontender, nondistended  MUSCULOSKELETAL: no deformity, no asymmetry of  extremities  NEURO: alert, moves all extremities, follows commands, no focal motor or sensory deficits evident        MEDICATIONS GIVEN IN ER  Medications   sodium chloride 0.9 % flush 10 mL (has no administration in time range)   sodium chloride 0.9 % flush 10 mL (has no administration in time range)         ORDERS PLACED DURING THIS VISIT:  Orders Placed This Encounter   Procedures    XR Chest 1 View    CT Head Without Contrast    [place] Draw    Comprehensive Metabolic Panel    Protime-INR    aPTT    Urinalysis With Culture If Indicated - Urine, Clean Catch    BNP    Single High Sensitivity Troponin T    Magnesium    CBC Auto Differential    High Sensitivity Troponin T 2Hr    LHA (on-call MD unless specified) Details    POC Glucose Once    ECG 12 Lead Bradycardia    Insert peripheral IV    Insert Peripheral IV    Green Top (Gel)    Lavender Top    Gold Top - SST    Light Blue Top    CBC & Differential         PROCEDURES  Procedures            PROGRESS, DATA ANALYSIS, CONSULTS, AND MEDICAL DECISION MAKING  All labs have been independently interpreted by me.  All radiology studies have been reviewed by me. All EKG's have been independently viewed and interpreted by me.  Discussion below represents my analysis of pertinent findings related to patient's condition, differential diagnosis, treatment plan and final disposition.    Differential diagnosis includes but is not limited to stroke, viral illness, hypoglycemia, UTI, dehydration, electrolyte abnormality.    Clinical Scores:                   ED Course as of 09/01/24 1626   Sun Sep 01, 2024   0747 Chest x-ray independently interpreted by me as no pneumothorax [MP]   0804 WBC: 5.79 [MP]   0804 Hemoglobin(!): 8.1 [MP]   0804 BUN(!): 27 [MP]   0804 Creatinine(!): 2.16 [MP]   0804 proBNP(!): 2,703.0 [MP]   0804 INR(!): 2.32 [MP]   0804 HS Troponin T(!!): 128 [MP]   0930 I spoke with Dr. Cuello with A.  Discussed patient presentation and ED findings.  He agrees to  admit patient to a telemetry bed. [MP]      ED Course User Index  [MP] Edil Ungerginna HOBBS, ELLEN       MDM:   EKG         EKG time/Interp time: 0709/0711  Rhythm/Rate: Sinus bradycardia, 48 bpm  P waves and WI: Present, 211 ms  QRS, axis: 136 ms, left bundle branch block, left axis deviation  ST and T waves: No ST segment elevations are present.  Nonspecific T wave changes notable.  Independently interpreted by me contemporaneously with treatment    I independently interpreted the Head CT w/o Contrast and my findings are: No acute hemorrhage, no midline shift    I independently interpreted the chest x-ray and my findings are: Cardiomegaly, no pneumothorax, bibasilar infiltrative changes present.    I have reviewed the labs and radiology reports from today's ED visit.  There are multiple abnormalities of concern here.  He has an elevated troponin despite no complaint of chest pain.  EKG shows some bradycardia but no ST segment elevation pattern to suggest an acute MI.  The troponin may be somewhat falsely elevated in the context of DIANE since his BUN and creatinine are elevated.  I do not have a good understanding what his typical baseline is for these labs, however.  He has an anemia with hemoglobin of 8.1 and hematocrit of 27.8.  However there is no sign or history of recent blood loss.  Given these multiple hematologic and metabolic and cardiac abnormalities, we will plan to admit the patient to the hospitalist service for further medical management today.  Fortunately, his work of breathing and oxygenation remain normal at this time.  Will continue to monitor vital signs carefully.    COMPLEXITY OF CARE  The patient requires admission.    Please note that portions of this document were completed with a voice recognition program.    Note Disclaimer: At [facility], we believe that sharing information builds trust and better relationships. You are receiving this note because you recently visited [facility]. It  is possible you will see health information before a provider has talked with you about it. This kind of information can be easy to misunderstand. To help you fully understand what it means for your health, we urge you to discuss this note with your provider.         Jose Carlos Plasencia MD  09/01/24 2446

## 2024-09-01 NOTE — ED NOTES
".Nursing report ED to floor  Huber Valdovinos  78 y.o.  male    HPI :  HPI (Adult)  Stated Reason for Visit: Weakness, bvradycardia  History Obtained From: EMS, family    Chief Complaint  Chief Complaint   Patient presents with    Weakness - Generalized       Admitting doctor:   Vargas Cuello MD    Admitting diagnosis:   The primary encounter diagnosis was Generalized weakness. Diagnoses of Sinus bradycardia, Elevated troponin, Elevated serum creatinine, and Hypervolemia, unspecified hypervolemia type were also pertinent to this visit.    Code status:   Current Code Status       Date Active Code Status Order ID Comments User Context       Not on file            Allergies:   Oxycodone    Isolation:   No active isolations    Intake and Output  No intake or output data in the 24 hours ending 09/01/24 0938    Weight:       09/01/24  0711   Weight: 82.4 kg (181 lb 10.5 oz)       Most recent vitals:   Vitals:    09/01/24 0704 09/01/24 0710 09/01/24 0711   BP: 121/56     Pulse: (!) 41     Resp: 23     Temp: 98 °F (36.7 °C)     TempSrc: Tympanic     SpO2: 98%     Weight:  78 kg (172 lb) 82.4 kg (181 lb 10.5 oz)   Height:  167.6 cm (66\")        Active LDAs/IV Access:   Lines, Drains & Airways       Active LDAs       Name Placement date Placement time Site Days    Peripheral IV 09/01/24 0703 Right Antecubital 09/01/24  0703  Antecubital  less than 1    Peripheral IV 09/01/24 0707 Forearm 09/01/24  0707  Forearm  less than 1                    Labs (abnormal labs have a star):   Labs Reviewed   COMPREHENSIVE METABOLIC PANEL - Abnormal; Notable for the following components:       Result Value    Glucose 161 (*)     BUN 27 (*)     Creatinine 2.16 (*)     Chloride 97 (*)     CO2 30.0 (*)     eGFR 31.0 (*)     All other components within normal limits    Narrative:     GFR Normal >60  Chronic Kidney Disease <60  Kidney Failure <15    The GFR formula is only valid for adults with stable renal function between ages 18 and 70. "   PROTIME-INR - Abnormal; Notable for the following components:    Protime 25.8 (*)     INR 2.32 (*)     All other components within normal limits   APTT - Abnormal; Notable for the following components:    PTT 37.7 (*)     All other components within normal limits   BNP (IN-HOUSE) - Abnormal; Notable for the following components:    proBNP 2,703.0 (*)     All other components within normal limits    Narrative:     This assay is used as an aid in the diagnosis of individuals suspected of having heart failure. It can be used as an aid in the diagnosis of acute decompensated heart failure (ADHF) in patients presenting with signs and symptoms of ADHF to the emergency department (ED). In addition, NT-proBNP of <300 pg/mL indicates ADHF is not likely.    Age Range Result Interpretation  NT-proBNP Concentration (pg/mL:      <50             Positive            >450                   Gray                 300-450                    Negative             <300    50-75           Positive            >900                  Gray                300-900                  Negative            <300      >75             Positive            >1800                  Gray                300-1800                  Negative            <300   SINGLE HS TROPONIN T - Abnormal; Notable for the following components:    HS Troponin T 128 (*)     All other components within normal limits    Narrative:     High Sensitive Troponin T Reference Range:  <14.0 ng/L- Negative Female for AMI  <22.0 ng/L- Negative Male for AMI  >=14 - Abnormal Female indicating possible myocardial injury.  >=22 - Abnormal Male indicating possible myocardial injury.   Clinicians would have to utilize clinical acumen, EKG, Troponin, and serial changes to determine if it is an Acute Myocardial Infarction or myocardial injury due to an underlying chronic condition.        CBC WITH AUTO DIFFERENTIAL - Abnormal; Notable for the following components:    RBC 3.35 (*)     Hemoglobin 8.1  (*)     Hematocrit 27.8 (*)     MCH 24.2 (*)     MCHC 29.1 (*)     RDW 16.2 (*)     Lymphocyte % 16.1 (*)     All other components within normal limits   POCT GLUCOSE FINGERSTICK - Abnormal; Notable for the following components:    Glucose 180 (*)     All other components within normal limits   MAGNESIUM - Normal   RAINBOW DRAW    Narrative:     The following orders were created for panel order [place] Draw.  Procedure                               Abnormality         Status                     ---------                               -----------         ------                     Green Top (Gel)[[id number]]                                  Final result               Lavender Top[[id number]]                                     Final result               Gold Top - SST[[id number]]                                   Final result               Light Blue Top[[id number]]                                   Final result                 Please view results for these tests on the individual orders.   URINALYSIS W/ CULTURE IF INDICATED   HIGH SENSITIVITIY TROPONIN T 2HR   GREEN TOP   LAVENDER TOP   GOLD TOP - SST   LIGHT BLUE TOP   CBC AND DIFFERENTIAL    Narrative:     The following orders were created for panel order CBC & Differential.  Procedure                               Abnormality         Status                     ---------                               -----------         ------                     CBC Auto Differential[[id number]]        Abnormal            Final result                 Please view results for these tests on the individual orders.       EKG:   ECG 12 Lead Bradycardia   Preliminary Result   HEART RATE=48  bpm   RR Btocpwxs=1578  ms   NH Frywtzdi=167  ms   P Horizontal Axis=59  deg   P Front Axis=17  deg   QRSD Kveyetqs=336  ms   QT Axgvpwjr=658  ms   TZvK=158  ms   QRS Axis=-40  deg   T Wave Axis=179  deg   - ABNORMAL ECG -   Sinus bradycardia   Left bundle branch block   Date and Time of Study:2024-09-01  07:09:06      ECG 12 Lead Bradycardia    (Results Pending)       Meds given in ED:   Medications   sodium chloride 0.9 % flush 10 mL (has no administration in time range)   sodium chloride 0.9 % flush 10 mL (has no administration in time range)   ondansetron ODT (ZOFRAN-ODT) disintegrating tablet 4 mg (has no administration in time range)     Or   ondansetron (ZOFRAN) injection 4 mg (has no administration in time range)   melatonin tablet 2.5 mg (has no administration in time range)   sennosides-docusate (PERICOLACE) 8.6-50 MG per tablet 2 tablet (has no administration in time range)     And   polyethylene glycol (MIRALAX) packet 17 g (has no administration in time range)     And   bisacodyl (DULCOLAX) EC tablet 5 mg (has no administration in time range)     And   bisacodyl (DULCOLAX) suppository 10 mg (has no administration in time range)       Imaging results:  CT Head Without Contrast    Result Date: 9/1/2024   1. No acute intracranial hemorrhage or mass/mass effect. 2. Patchy periventricular and subcortical white matter low-attenuation, statistically representing age-indeterminate small vessel ischemic changes. 3. Likely benign focal calcification in the brent. Could consider further evaluation with MRI if there is persistent clinical concern.  This report was finalized on 9/1/2024 8:42 AM by Abimael Rivera MD on Workstation: [id number]      XR Chest 1 View    Result Date: 9/1/2024   1. Low lung volumes with ill-defined right greater than left predominant perihilar and basilar opacities in both lungs, which could represent small bilateral pleural effusions with underlying atelectasis and/or pneumonia and possible superimposed mild pulmonary edema. 2. Cardiomegaly, likely accentuated by technique.  This report was finalized on 9/1/2024 7:54 AM by Abimael Rivera MD on Workstation: [id number]       Ambulatory status:   - Assist    Social issues:   Social History     Socioeconomic History    Marital status: Single    Tobacco Use    Smoking status: Every Day     Current packs/day: 0.50     Types: Cigarettes    Smokeless tobacco: Never   Substance and Sexual Activity    Drug use: Never       Peripheral Neurovascular  Peripheral Neurovascular (Adult)  Peripheral Neurovascular WDL: WDL    Neuro Cognitive  Neuro Cognitive (Adult)  Cognitive/Neuro/Behavioral WDL: WDL    Learning  Learning Assessment (Adult)  Learning Readiness and Ability: no barriers identified  Education Provided  Person Taught: patient    Respiratory  Respiratory WDL  Respiratory WDL: WDL    Abdominal Pain       Pain Assessments  Pain (Adult)  (0-10) Pain Rating: Rest: 0      Randall Gomez RN  09/01/24 09:38 EDT

## 2024-09-01 NOTE — ED NOTES
Pt arrived to ed from home via EMS. EMS reports thewy were called out for a called out for CVA. Pt heart rate in the 40s, pt weak and lack of coordination since 2000 last night.

## 2024-09-01 NOTE — ED PROVIDER NOTES
" EMERGENCY DEPARTMENT ENCOUNTER  Room Number:  08/08  PCP: Luis Slaughter MD  Independent Historians: Patient and EMS      HPI:  Chief Complaint: had concerns including Weakness - Generalized.     A complete HPI/ROS/PMH/PSH/SH/FH are unobtainable due to: Poor historian    Chronic or social conditions impacting patient care (Social Determinants of Health): None      Context: Huber Valdovinos is a 78 y.o. male with a medical history of hypertension, diabetes, atrial fibrillation, GERD who presents to the ED c/o acute generalized weakness.  Patient was last known to be normal at 2000 last night.  Patient reports he woke at 0030 and felt weak all over and like he did not have good coordination.  He reports he feels \"hollow\" inside.  He thought this may be related to his blood sugar, but when he checked it it was 98.  He denies chest pain.  No reported vomiting.  No witnessed fall.  EMS was called this morning for possible stroke.  On EMS arrival, patient noted to be bradycardic.  He does have known history of atrial fibrillation.  History otherwise limited as patient is poor historian.      Review of prior external notes (non-ED) -and- Review of prior external test results outside of this encounter:  Patient seen at urgent care on 11/10/2023 for anxiety.  Reviewed assessment and plan.  Patient prescribed hydroxyzine.  No prior laboratory or imaging studies in system available for review.    Prescription drug monitoring program review:     N/A    PAST MEDICAL HISTORY  Active Ambulatory Problems     Diagnosis Date Noted    No Active Ambulatory Problems     Resolved Ambulatory Problems     Diagnosis Date Noted    No Resolved Ambulatory Problems     No Additional Past Medical History         PAST SURGICAL HISTORY  No past surgical history on file.      FAMILY HISTORY  No family history on file.      SOCIAL HISTORY  Social History     Socioeconomic History    Marital status: Single   Tobacco Use    Smoking status: " Every Day     Current packs/day: 0.50     Types: Cigarettes    Smokeless tobacco: Never   Substance and Sexual Activity    Drug use: Never         ALLERGIES  Oxycodone      REVIEW OF SYSTEMS  Review of Systems   Constitutional:  Negative for chills and fever.   HENT:  Negative for ear pain and sore throat.    Respiratory:  Negative for cough and shortness of breath.    Cardiovascular:  Negative for chest pain and palpitations.   Gastrointestinal:  Negative for abdominal pain and vomiting.   Genitourinary:  Negative for dysuria and hematuria.   Musculoskeletal:  Negative for arthralgias and joint swelling.   Skin:  Negative for pallor and rash.   Neurological:  Positive for weakness. Negative for syncope and headaches.   Psychiatric/Behavioral:  Negative for confusion and hallucinations.      Included in HPI  All systems reviewed and negative except for those discussed in HPI.      PHYSICAL EXAM    I have reviewed the triage vital signs and nursing notes.    ED Triage Vitals [09/01/24 0704]   Temp Heart Rate Resp BP SpO2   98 °F (36.7 °C) (!) 41 23 121/56 98 %      Temp src Heart Rate Source Patient Position BP Location FiO2 (%)   Tympanic Monitor -- -- --       Physical Exam  Constitutional:       General: He is not in acute distress.     Appearance: Normal appearance.   HENT:      Head: Normocephalic and atraumatic.      Nose: Nose normal.      Mouth/Throat:      Mouth: Mucous membranes are moist.   Eyes:      Conjunctiva/sclera: Conjunctivae normal.      Pupils: Pupils are equal, round, and reactive to light.   Cardiovascular:      Rate and Rhythm: Normal rate and regular rhythm.      Pulses: Normal pulses.      Heart sounds: Normal heart sounds.   Pulmonary:      Effort: Pulmonary effort is normal.      Breath sounds: Normal breath sounds.   Abdominal:      General: There is no distension.   Musculoskeletal:         General: Normal range of motion.      Cervical back: Normal range of motion and neck supple.    Skin:     General: Skin is warm.      Capillary Refill: Capillary refill takes less than 2 seconds.   Neurological:      General: No focal deficit present.      Mental Status: He is alert.      Comments: Patient oriented to person and place.  CN II through XII intact.  No aphasia or dysarthria.  No facial asymmetry.  No drift of the upper extremities.  Sensation intact to light touch all 4 extremities.  Motor strength 5/5 all 4 extremities.   Psychiatric:         Mood and Affect: Mood normal.           LAB RESULTS  Recent Results (from the past 24 hour(s))   ECG 12 Lead Bradycardia    Collection Time: 09/01/24  7:09 AM   Result Value Ref Range    QT Interval 463 ms    QTC Interval 414 ms   Green Top (Gel)    Collection Time: 09/01/24  7:10 AM   Result Value Ref Range    Extra Tube Hold for add-ons.    Lavender Top    Collection Time: 09/01/24  7:10 AM   Result Value Ref Range    Extra Tube hold for add-on    Gold Top - SST    Collection Time: 09/01/24  7:10 AM   Result Value Ref Range    Extra Tube Hold for add-ons.    Light Blue Top    Collection Time: 09/01/24  7:10 AM   Result Value Ref Range    Extra Tube Hold for add-ons.    Comprehensive Metabolic Panel    Collection Time: 09/01/24  7:10 AM    Specimen: Blood   Result Value Ref Range    Glucose 161 (H) 65 - 99 mg/dL    BUN 27 (H) 8 - 23 mg/dL    Creatinine 2.16 (H) 0.76 - 1.27 mg/dL    Sodium 137 136 - 145 mmol/L    Potassium 4.3 3.5 - 5.2 mmol/L    Chloride 97 (L) 98 - 107 mmol/L    CO2 30.0 (H) 22.0 - 29.0 mmol/L    Calcium 9.4 8.6 - 10.5 mg/dL    Total Protein 7.3 6.0 - 8.5 g/dL    Albumin 3.9 3.5 - 5.2 g/dL    ALT (SGPT) 8 1 - 41 U/L    AST (SGOT) 19 1 - 40 U/L    Alkaline Phosphatase 110 39 - 117 U/L    Total Bilirubin 1.0 0.0 - 1.2 mg/dL    Globulin 3.4 gm/dL    A/G Ratio 1.1 g/dL    BUN/Creatinine Ratio 12.5 7.0 - 25.0    Anion Gap 10.0 5.0 - 15.0 mmol/L    eGFR 31.0 (L) >60.0 mL/min/1.73   Protime-INR    Collection Time: 09/01/24  7:10 AM     Specimen: Blood   Result Value Ref Range    Protime 25.8 (H) 11.7 - 14.2 Seconds    INR 2.32 (H) 0.90 - 1.10   aPTT    Collection Time: 09/01/24  7:10 AM    Specimen: Blood   Result Value Ref Range    PTT 37.7 (H) 22.7 - 35.4 seconds   BNP    Collection Time: 09/01/24  7:10 AM    Specimen: Blood   Result Value Ref Range    proBNP 2,703.0 (H) 0.0 - 1,800.0 pg/mL   Single High Sensitivity Troponin T    Collection Time: 09/01/24  7:10 AM    Specimen: Blood   Result Value Ref Range    HS Troponin T 128 (C) <22 ng/L   Magnesium    Collection Time: 09/01/24  7:10 AM    Specimen: Blood   Result Value Ref Range    Magnesium 2.4 1.6 - 2.4 mg/dL   CBC Auto Differential    Collection Time: 09/01/24  7:10 AM    Specimen: Blood   Result Value Ref Range    WBC 5.79 3.40 - 10.80 10*3/mm3    RBC 3.35 (L) 4.14 - 5.80 10*6/mm3    Hemoglobin 8.1 (L) 13.0 - 17.7 g/dL    Hematocrit 27.8 (L) 37.5 - 51.0 %    MCV 83.0 79.0 - 97.0 fL    MCH 24.2 (L) 26.6 - 33.0 pg    MCHC 29.1 (L) 31.5 - 35.7 g/dL    RDW 16.2 (H) 12.3 - 15.4 %    RDW-SD 48.9 37.0 - 54.0 fl    MPV 9.1 6.0 - 12.0 fL    Platelets 342 140 - 450 10*3/mm3    Neutrophil % 70.9 42.7 - 76.0 %    Lymphocyte % 16.1 (L) 19.6 - 45.3 %    Monocyte % 10.0 5.0 - 12.0 %    Eosinophil % 1.6 0.3 - 6.2 %    Basophil % 0.9 0.0 - 1.5 %    Immature Grans % 0.5 0.0 - 0.5 %    Neutrophils, Absolute 4.11 1.70 - 7.00 10*3/mm3    Lymphocytes, Absolute 0.93 0.70 - 3.10 10*3/mm3    Monocytes, Absolute 0.58 0.10 - 0.90 10*3/mm3    Eosinophils, Absolute 0.09 0.00 - 0.40 10*3/mm3    Basophils, Absolute 0.05 0.00 - 0.20 10*3/mm3    Immature Grans, Absolute 0.03 0.00 - 0.05 10*3/mm3    nRBC 0.0 0.0 - 0.2 /100 WBC   POC Glucose Once    Collection Time: 09/01/24  7:19 AM    Specimen: Blood   Result Value Ref Range    Glucose 180 (H) 70 - 130 mg/dL         RADIOLOGY  CT Head Without Contrast    Result Date: 9/1/2024  CT HEAD WO CONTRAST-  DATE OF EXAM: 9/1/2024 8:21 AM  INDICATION: Generalized weakness.   COMPARISON: None available.  TECHNIQUE: Multiple contiguous axial images were acquired through the head without the intravenous administration of contrast. Reformatted coronal and sagittal sequences were also reviewed. Radiation dose reduction techniques were utilized, including automated exposure control and exposure modulation based on body size.  FINDINGS: No acute intracranial hemorrhage or mass/mass effect. The ventricles and sulci are appropriate for age. The basilar cisterns are patent. Patchy periventricular and subcortical white matter low-attenuation, statistically representing age-indeterminate small vessel ischemic changes. Gray-white matter differentiation is otherwise grossly maintained. Likely benign focal calcification in the brent. Mild mucosal thickening in the partially imaged left maxillary sinus. Limited imaging of the orbits and mastoid air cells is unremarkable. No acute osseous abnormality is identified.       1. No acute intracranial hemorrhage or mass/mass effect. 2. Patchy periventricular and subcortical white matter low-attenuation, statistically representing age-indeterminate small vessel ischemic changes. 3. Likely benign focal calcification in the brent. Could consider further evaluation with MRI if there is persistent clinical concern.  This report was finalized on 9/1/2024 8:42 AM by Abimael Rivera MD on Workstation: [id number]      XR Chest 1 View    Result Date: 9/1/2024  XR CHEST 1 VW-  DATE OF EXAM: 9/1/2024 7:22 AM  INDICATION: generalized weakness.  COMPARISON: None available.  TECHNIQUE: A single portable AP view of the chest was obtained.  FINDINGS: Lordotic positioning. Overlying artifact. Low lung volumes with ill-defined right greater than left predominantly perihilar and basilar opacities in both lungs, obscuring the right hemidiaphragm and each costophrenic angle. Tiny nodules in both lungs likely represent tiny calcified granulomas. No pneumothorax. Enlargement of the  cardiac silhouette, likely accentuated by technique. No acute osseous abnormality is identified.       1. Low lung volumes with ill-defined right greater than left predominant perihilar and basilar opacities in both lungs, which could represent small bilateral pleural effusions with underlying atelectasis and/or pneumonia and possible superimposed mild pulmonary edema. 2. Cardiomegaly, likely accentuated by technique.  This report was finalized on 9/1/2024 7:54 AM by Abimael Rivera MD on Workstation: [id number]         MEDICATIONS GIVEN IN ER  Medications   sodium chloride 0.9 % flush 10 mL (has no administration in time range)   sodium chloride 0.9 % flush 10 mL (has no administration in time range)   ondansetron ODT (ZOFRAN-ODT) disintegrating tablet 4 mg (has no administration in time range)     Or   ondansetron (ZOFRAN) injection 4 mg (has no administration in time range)   melatonin tablet 2.5 mg (has no administration in time range)   sennosides-docusate (PERICOLACE) 8.6-50 MG per tablet 2 tablet (has no administration in time range)     And   polyethylene glycol (MIRALAX) packet 17 g (has no administration in time range)     And   bisacodyl (DULCOLAX) EC tablet 5 mg (has no administration in time range)     And   bisacodyl (DULCOLAX) suppository 10 mg (has no administration in time range)         ORDERS PLACED DURING THIS VISIT:  Orders Placed This Encounter   Procedures    XR Chest 1 View    CT Head Without Contrast    Carter Draw    Comprehensive Metabolic Panel    Protime-INR    aPTT    Urinalysis With Culture If Indicated - Urine, Clean Catch    BNP    Single High Sensitivity Troponin T    Magnesium    CBC Auto Differential    High Sensitivity Troponin T 2Hr    Basic Metabolic Panel    CBC (No Diff)    Hemoglobin A1c    Diet: Cardiac; Low Sodium (2g); Fluid Consistency: Thin (IDDSI 0)    Obtain Medical Records    Vital Signs    Up with assistance    Daily Weights    Strict Intake & Output    Oral Care     Place Sequential Compression Device    Maintain Sequential Compression Device    Please message Dr Cuello [facility] and medication reconciliation has been completed  Nursing Communication    [facility] (on-call MD unless specified) Details    PT Consult: Eval & Treat as tolerated    POC Glucose Once    ECG 12 Lead Bradycardia    ECG 12 Lead Bradycardia    Insert peripheral IV    Insert Peripheral IV    Initiate Observation Status    Initiate Observation Status    Green Top (Gel)    Lavender Top    Gold Top - SST    Light Blue Top    CBC & Differential         OUTPATIENT MEDICATION MANAGEMENT:  Current Facility-Administered Medications Ordered in Epic   Medication Dose Route Frequency Provider Last Rate Last Admin    sennosides-docusate (PERICOLACE) 8.6-50 MG per tablet 2 tablet  2 tablet Oral BID PRN Vargas Cuello MD        And    polyethylene glycol (MIRALAX) packet 17 g  17 g Oral Daily PRN Vargas Cuello MD        And    bisacodyl (DULCOLAX) EC tablet 5 mg  5 mg Oral Daily PRN Vargas Cuello MD        And    bisacodyl (DULCOLAX) suppository 10 mg  10 mg Rectal Daily PRN Vargas Cuello MD        melatonin tablet 2.5 mg  2.5 mg Oral Nightly PRN Vargas Cuello MD        ondansetron ODT (ZOFRAN-ODT) disintegrating tablet 4 mg  4 mg Oral Q6H PRN Vargas Cuello MD        Or    ondansetron (ZOFRAN) injection 4 mg  4 mg Intravenous Q6H PRN Vargas Cuello MD        sodium chloride 0.9 % flush 10 mL  10 mL Intravenous PRN Jose Carlos Plasencia MD        sodium chloride 0.9 % flush 10 mL  10 mL Intravenous PRN Nandini Unger PA-C         Current Outpatient Medications Ordered in Epic   Medication Sig Dispense Refill    bumetanide (BUMEX) 2 MG tablet Take 1 tablet by mouth Daily.      finasteride (PROSCAR) 5 MG tablet Take 1 tablet by mouth Daily.      gabapentin (NEURONTIN) 300 MG capsule Take 1 capsule by mouth 3 (Three) Times a Day.      rivaroxaban (XARELTO) 15 MG tablet Take 1 tablet by mouth  Daily.      Scopolamine 1 MG/3DAYS patch Place 1 patch on the skin as directed by provider Every 72 (Seventy-Two) Hours.      tiZANidine (ZANAFLEX) 4 MG tablet Take 1 tablet by mouth At Night As Needed for Muscle Spasms.      traMADol (ULTRAM) 50 MG tablet Take 1 tablet by mouth Every 8 (Eight) Hours As Needed for Moderate Pain.           PROGRESS, DATA ANALYSIS, CONSULTS, AND MEDICAL DECISION MAKING  All labs have been independently interpreted by me.  All radiology studies have been reviewed by me. All EKG's have been independently viewed and interpreted by me.  Discussion below represents my analysis of pertinent findings related to patient's condition, differential diagnosis, treatment plan and final disposition.    Differential diagnosis includes but is not limited to symptomatic bradycardia, electrolyte deficiency, CVA.        ED Course as of 09/01/24 0932   Sun Sep 01, 2024   0747 Chest x-ray independently interpreted by me as no pneumothorax [MP]   0804 WBC: 5.79 [MP]   0804 Hemoglobin(!): 8.1 [MP]   0804 BUN(!): 27 [MP]   0804 Creatinine(!): 2.16 [MP]   0804 proBNP(!): 2,703.0 [MP]   0804 INR(!): 2.32 [MP]   0804 HS Troponin T(!!): 128 [MP]   0930 I spoke with Dr. Munoz with [facility].  Discussed patient presentation and ED findings.  He agrees to admit patient to a telemetry bed. [MP]      ED Course User Index  [MP] Nandini Unger PA-C             AS OF 09:32 EDT VITALS:    BP - 121/56  HR - (!) 41  TEMP - 98 °F (36.7 °C) (Tympanic)  O2 SATS - 98%    COMPLEXITY OF CARE  The patient requires admission.      DIAGNOSIS  Final diagnoses:   Generalized weakness   Sinus bradycardia   Elevated troponin   Elevated serum creatinine   Hypervolemia, unspecified hypervolemia type         DISPOSITION  ED Disposition       ED Disposition   Decision to Admit    Condition   --    Comment   Level of Care: Telemetry [5]   Diagnosis: Generalized weakness [772103]   Admitting Physician: RAQUEL MUNOZ [[id number]]   Attending  Physician: RAQUEL MUNOZ [[id number]]   Bed Request Comments: NOT 5 SOUTH                  Please note that portions of this document were completed with a voice recognition program.    Note Disclaimer: At [facility], we believe that sharing information builds trust and better relationships. You are receiving this note because you recently visited [facility]. It is possible you will see health information before a provider has talked with you about it. This kind of information can be easy to misunderstand. To help you fully understand what it means for your health, we urge you to discuss this note with your provider.         Nandini Unger PA-C  09/01/24 0932

## 2024-09-02 LAB
ANION GAP SERPL CALCULATED.3IONS-SCNC: 14.6 MMOL/L (ref 5–15)
BUN SERPL-MCNC: 28 MG/DL (ref 8–23)
BUN/CREAT SERPL: 12.1 (ref 7–25)
CALCIUM SPEC-SCNC: 9.7 MG/DL (ref 8.6–10.5)
CHLORIDE SERPL-SCNC: 95 MMOL/L (ref 98–107)
CO2 SERPL-SCNC: 29.4 MMOL/L (ref 22–29)
CREAT SERPL-MCNC: 2.31 MG/DL (ref 0.76–1.27)
DEPRECATED RDW RBC AUTO: 45.5 FL (ref 37–54)
EGFRCR SERPLBLD CKD-EPI 2021: 28.6 ML/MIN/1.73
ERYTHROCYTE [DISTWIDTH] IN BLOOD BY AUTOMATED COUNT: 16.1 % (ref 12.3–15.4)
FOLATE SERPL-MCNC: 8.05 NG/ML (ref 4.78–24.2)
GLUCOSE BLDC GLUCOMTR-MCNC: 105 MG/DL (ref 70–130)
GLUCOSE BLDC GLUCOMTR-MCNC: 85 MG/DL (ref 70–130)
GLUCOSE BLDC GLUCOMTR-MCNC: 87 MG/DL (ref 70–130)
GLUCOSE BLDC GLUCOMTR-MCNC: 94 MG/DL (ref 70–130)
GLUCOSE SERPL-MCNC: 80 MG/DL (ref 65–99)
HBA1C MFR BLD: 5.8 % (ref 4.8–5.6)
HCT VFR BLD AUTO: 27.5 % (ref 37.5–51)
HCT VFR BLD AUTO: 29.2 % (ref 37.5–51)
HGB BLD-MCNC: 8.6 G/DL (ref 13–17.7)
HGB BLD-MCNC: 9 G/DL (ref 13–17.7)
MAGNESIUM SERPL-MCNC: 2.5 MG/DL (ref 1.6–2.4)
MCH RBC QN AUTO: 24.3 PG (ref 26.6–33)
MCHC RBC AUTO-ENTMCNC: 30.8 G/DL (ref 31.5–35.7)
MCV RBC AUTO: 78.9 FL (ref 79–97)
PLATELET # BLD AUTO: 347 10*3/MM3 (ref 140–450)
PMV BLD AUTO: 8.6 FL (ref 6–12)
POTASSIUM SERPL-SCNC: 4.1 MMOL/L (ref 3.5–5.2)
RBC # BLD AUTO: 3.7 10*6/MM3 (ref 4.14–5.8)
SODIUM SERPL-SCNC: 139 MMOL/L (ref 136–145)
VIT B12 BLD-MCNC: 771 PG/ML (ref 211–946)
WBC NRBC COR # BLD AUTO: 6.35 10*3/MM3 (ref 3.4–10.8)

## 2024-09-02 PROCEDURE — 82607 VITAMIN B-12: CPT | Performed by: STUDENT IN AN ORGANIZED HEALTH CARE EDUCATION/TRAINING PROGRAM

## 2024-09-02 PROCEDURE — 92610 EVALUATE SWALLOWING FUNCTION: CPT

## 2024-09-02 PROCEDURE — 97530 THERAPEUTIC ACTIVITIES: CPT

## 2024-09-02 PROCEDURE — 99204 OFFICE O/P NEW MOD 45 MIN: CPT | Performed by: INTERNAL MEDICINE

## 2024-09-02 PROCEDURE — 25810000003 SODIUM CHLORIDE 0.9 % SOLUTION: Performed by: STUDENT IN AN ORGANIZED HEALTH CARE EDUCATION/TRAINING PROGRAM

## 2024-09-02 PROCEDURE — 97165 OT EVAL LOW COMPLEX 30 MIN: CPT

## 2024-09-02 PROCEDURE — 96366 THER/PROPH/DIAG IV INF ADDON: CPT

## 2024-09-02 PROCEDURE — 96365 THER/PROPH/DIAG IV INF INIT: CPT

## 2024-09-02 PROCEDURE — 97161 PT EVAL LOW COMPLEX 20 MIN: CPT

## 2024-09-02 PROCEDURE — 82948 REAGENT STRIP/BLOOD GLUCOSE: CPT

## 2024-09-02 PROCEDURE — G0378 HOSPITAL OBSERVATION PER HR: HCPCS

## 2024-09-02 PROCEDURE — 85027 COMPLETE CBC AUTOMATED: CPT | Performed by: STUDENT IN AN ORGANIZED HEALTH CARE EDUCATION/TRAINING PROGRAM

## 2024-09-02 PROCEDURE — 25010000002 CEFTRIAXONE PER 250 MG: Performed by: STUDENT IN AN ORGANIZED HEALTH CARE EDUCATION/TRAINING PROGRAM

## 2024-09-02 PROCEDURE — 80048 BASIC METABOLIC PNL TOTAL CA: CPT | Performed by: STUDENT IN AN ORGANIZED HEALTH CARE EDUCATION/TRAINING PROGRAM

## 2024-09-02 PROCEDURE — 36415 COLL VENOUS BLD VENIPUNCTURE: CPT | Performed by: STUDENT IN AN ORGANIZED HEALTH CARE EDUCATION/TRAINING PROGRAM

## 2024-09-02 PROCEDURE — 82746 ASSAY OF FOLIC ACID SERUM: CPT | Performed by: STUDENT IN AN ORGANIZED HEALTH CARE EDUCATION/TRAINING PROGRAM

## 2024-09-02 PROCEDURE — 96367 TX/PROPH/DG ADDL SEQ IV INF: CPT

## 2024-09-02 PROCEDURE — 83036 HEMOGLOBIN GLYCOSYLATED A1C: CPT | Performed by: STUDENT IN AN ORGANIZED HEALTH CARE EDUCATION/TRAINING PROGRAM

## 2024-09-02 PROCEDURE — 25010000002 NA FERRIC GLUC CPLX PER 12.5 MG: Performed by: STUDENT IN AN ORGANIZED HEALTH CARE EDUCATION/TRAINING PROGRAM

## 2024-09-02 PROCEDURE — 83735 ASSAY OF MAGNESIUM: CPT | Performed by: STUDENT IN AN ORGANIZED HEALTH CARE EDUCATION/TRAINING PROGRAM

## 2024-09-02 PROCEDURE — 85018 HEMOGLOBIN: CPT | Performed by: STUDENT IN AN ORGANIZED HEALTH CARE EDUCATION/TRAINING PROGRAM

## 2024-09-02 PROCEDURE — 85014 HEMATOCRIT: CPT | Performed by: STUDENT IN AN ORGANIZED HEALTH CARE EDUCATION/TRAINING PROGRAM

## 2024-09-02 RX ORDER — CETIRIZINE HYDROCHLORIDE 10 MG/1
5 TABLET ORAL ONCE
Status: COMPLETED | OUTPATIENT
Start: 2024-09-02 | End: 2024-09-02

## 2024-09-02 RX ORDER — ACETAMINOPHEN 325 MG/1
650 TABLET ORAL ONCE
Status: COMPLETED | OUTPATIENT
Start: 2024-09-02 | End: 2024-09-02

## 2024-09-02 RX ORDER — HYDROCODONE BITARTRATE AND ACETAMINOPHEN 5; 325 MG/1; MG/1
1 TABLET ORAL EVERY 6 HOURS PRN
Status: DISCONTINUED | OUTPATIENT
Start: 2024-09-02 | End: 2024-09-03 | Stop reason: HOSPADM

## 2024-09-02 RX ADMIN — CETIRIZINE HYDROCHLORIDE 5 MG: 10 TABLET, FILM COATED ORAL at 14:40

## 2024-09-02 RX ADMIN — HYDROCODONE BITARTRATE AND ACETAMINOPHEN 1 TABLET: 5; 325 TABLET ORAL at 12:24

## 2024-09-02 RX ADMIN — GABAPENTIN 300 MG: 300 CAPSULE ORAL at 16:45

## 2024-09-02 RX ADMIN — GABAPENTIN 300 MG: 300 CAPSULE ORAL at 21:18

## 2024-09-02 RX ADMIN — ACETAMINOPHEN 325MG 650 MG: 325 TABLET ORAL at 12:24

## 2024-09-02 RX ADMIN — BUMETANIDE 2 MG: 2 TABLET ORAL at 09:16

## 2024-09-02 RX ADMIN — CEFTRIAXONE SODIUM 1000 MG: 1 INJECTION, POWDER, FOR SOLUTION INTRAMUSCULAR; INTRAVENOUS at 12:24

## 2024-09-02 RX ADMIN — FINASTERIDE 5 MG: 5 TABLET, FILM COATED ORAL at 09:16

## 2024-09-02 RX ADMIN — TRAMADOL HYDROCHLORIDE 50 MG: 50 TABLET, FILM COATED ORAL at 05:20

## 2024-09-02 RX ADMIN — TIZANIDINE 4 MG: 4 TABLET ORAL at 21:24

## 2024-09-02 RX ADMIN — RIVAROXABAN 15 MG: 15 TABLET, FILM COATED ORAL at 21:18

## 2024-09-02 RX ADMIN — GABAPENTIN 300 MG: 300 CAPSULE ORAL at 09:16

## 2024-09-02 NOTE — PLAN OF CARE
"Goal Outcome Evaluation:  Plan of Care Reviewed With: patient           Outcome Evaluation: Clinical swallow evaluation completed this date. Per order, \"pt choked twice\" previous date, however pt reports he did not choke and rather had made himself vomit as he could feel that the food was not going down. He contributes difficulty swallowing to nissen fundoplication. RN giving meds whole with water at start of session. Pt took multiple pills at one time, no overt s/s of aspiration. Pt then self fed PO trials of thin liquid by straw, mixed, and regular solid. Timely and functional mastication. Suspect good bolus control and timely oral transit. Laryngeal elevation subjectivley functional. No overt s/s of aspiration such as cough, throat clear, or wet vocal quality at any time. Recommend regular diet and thin liquids. Meds whole as able. General aspiration precautions. Defer to GI for nissen management. SLP to sign off as oropharyngeal swallow appears functional. Please re consult as indicated.      Anticipated Discharge Disposition (SLP): No further SLP services warranted          SLP Swallowing Diagnosis: swallow WFL/no suspected pharyngeal impairment, esophageal dysphagia (09/02/24 1200)             "

## 2024-09-02 NOTE — PLAN OF CARE
Goal Outcome Evaluation:  Plan of Care Reviewed With: patient        Progress: no change          Pain medication requested x 1. No acute events for this shift. A/o x 4. R. A. Call light within reach. Plan of care ongoing. Medications given per MAR.

## 2024-09-02 NOTE — PLAN OF CARE
Goal Outcome Evaluation:  Plan of Care Reviewed With: patient           Outcome Evaluation: No acute changes today. VSS. Meds given per MAR. WCTM.

## 2024-09-02 NOTE — THERAPY EVALUATION
Patient Name: Huber Valdovinos  : 1945    MRN: [id number]                              Today's Date: 2024       Admit Date: 2024    Visit Dx:     ICD-10-CM ICD-9-CM   1. Generalized weakness  R53.1 780.79   2. Sinus bradycardia  R00.1 427.89   3. Elevated troponin  R79.89 790.6   4. Elevated serum creatinine  R79.89 790.99   5. Hypervolemia, unspecified hypervolemia type  E87.70 276.69     Patient Active Problem List   Diagnosis    Generalized weakness    CHF (congestive heart failure)    CKD (chronic kidney disease)    Type 2 diabetes mellitus    GERD (gastroesophageal reflux disease)    Paroxysmal atrial fibrillation    Colostomy in place    Macular degeneration     Past Medical History:   Diagnosis Date    Anal fissure     Anemia     BPH (benign prostatic hyperplasia)     Chronic pain     CKD (chronic kidney disease)     Cryoglobulinemia     Degeneration of cervical intervertebral disc     Diverticulitis     Episodic cluster headache     GERD (gastroesophageal reflux disease)     Hemorrhage of gastrointestinal tract     Hypothyroidism     Legal blindness     Macular degeneration     Non-healing surgical wound     Obesity     Osteoarthritis     Panic attacks     Paroxysmal atrial fibrillation     Personal history of alcoholism     Post-thoracotomy pain     Seborrhea     Tobacco user     Urethral stricture     Ventral hernia      Past Surgical History:   Procedure Laterality Date    ABDOMINAL SURGERY      CARDIAC CATHETERIZATION      COLON SURGERY      COLONOSCOPY      GASTRECTOMY      HERNIA REPAIR      JOINT REPLACEMENT        General Information       Row Name 24 1301          Physical Therapy Time and Intention    Document Type evaluation  -CB     Mode of Treatment physical therapy  -CB       Row Name 24 1301          General Information    Patient Profile Reviewed yes  -CB     Prior Level of Function independent:;gait;transfer;bed mobility  no AD at baseline but has SPC if needed   -CB     Existing Precautions/Restrictions fall  -CB     Barriers to Rehab hearing deficit  -CB       Row Name 09/02/24 1301          Living Environment    People in Home alone  -CB       Row Name 09/02/24 1301          Home Main Entrance    Number of Stairs, Main Entrance --  elevators  -CB       Row Name 09/02/24 1301          Cognition    Orientation Status (Cognition) oriented x 4  -CB               User Key  (r) = Recorded By, (t) = Taken By, (c) = Cosigned By      Initials Name Provider Type    Nisa Meza, JESS Physical Therapist                   Mobility       Row Name 09/02/24 1303          Bed Mobility    Bed Mobility supine-sit;sit-supine  -CB     Supine-Sit [place] (Bed Mobility) standby assist  -CB     Sit-Supine [place] (Bed Mobility) standby assist  -CB       Row Name 09/02/24 1303          Sit-Stand Transfer    Sit-Stand [place] (Transfers) standby assist  -CB     Assistive Device (Sit-Stand Transfers) --  no AD  -CB     Comment, (Sit-Stand Transfer) x2 STS reps  -CB       Row Name 09/02/24 1303          Gait/Stairs (Locomotion)    [place] Level (Gait) contact guard;standby assist;verbal cues  -CB     Assistive Device (Gait) --  no AD  -CB     Distance in Feet (Gait) 150  30ft  -CB     Deviations/Abnormal Patterns (Gait) gait speed decreased;stride length decreased  -CB     Comment, (Gait/Stairs) no overt LOB or unsteadiness noted; progressed to SBA during ambulation. pt reports he will plan to use his SPC at dc  -CB               User Key  (r) = Recorded By, (t) = Taken By, (c) = Cosigned By      Initials Name Provider Type    Nisa Meza, PT Physical Therapist                   Obj/Interventions       Row Name 09/02/24 1304          Range of Motion Comprehensive    General Range of Motion bilateral lower extremity ROM WFL  -CB       Row Name 09/02/24 1304          Strength Comprehensive (MMT)    General Manual Muscle Testing (MMT) Assessment no strength deficits  identified  -CB       Row Name 09/02/24 1304          Balance    Balance Assessment standing static balance;standing dynamic balance  -CB     Static Standing Balance supervision  -CB     Dynamic Standing Balance standby assist;contact guard  -CB     Position/Device Used, Standing Balance unsupported  -CB     Balance Interventions standing;sit to stand;supported;static;dynamic;minimal challenge  -CB       Row Name 09/02/24 1304          Sensory Assessment (Somatosensory)    Sensory Assessment (Somatosensory) sensation intact  -CB               User Key  (r) = Recorded By, (t) = Taken By, (c) = Cosigned By      Initials Name Provider Type    Nisa Meza, PT Physical Therapist                   Goals/Plan    No documentation.                  Clinical Impression       Row Name 09/02/24 1304          Pain    Pretreatment Pain Rating 0/10 - no pain  -CB     Posttreatment Pain Rating 0/10 - no pain  -CB       Row Name 09/02/24 1302          Plan of Care Review    Plan of Care Reviewed With patient  -CB     Outcome Evaluation Patient is a 79 yo male who presented with sinus bradycardia and weakness. PMHx includes diverticulitis requiring partial colectomy and now colostomy, HTN, DM, GERD. He lives in senior apartment with elevator access. He denies use of AD but then states uses SPC at needed. He is very [place]. Today pt performed bed mobility requiring SBA, STS requiring SBA, and ambulated 150ft and 35ft without AD requiring CGA then progressing to SBA. No overt LOB noted. Pt reports he is at his baseline mobility and plans to use his SPC when he gets home. PT to s/o and plan home at [place].  -CB       Row Name 09/02/24 1304          Therapy Assessment/Plan (PT)    Therapy Frequency (PT) evaluation only  -CB       Row Name 09/02/24 1306          Positioning and Restraints    Pre-Treatment Position in bed  -CB     Post Treatment Position bed  -CB     In Bed notified nsg;fowlers;call light within reach;encouraged to call for  assist;exit alarm on;side rails up x3  -CB               User Key  (r) = Recorded By, (t) = Taken By, (c) = Cosigned By      Initials Name Provider Type    Nisa Meza, PT Physical Therapist                   Outcome Measures       Row Name 09/02/24 1307          How much help from another person do you currently need...    Turning from your back to your side while in flat bed without using bedrails? 4  -CB     Moving from lying on back to sitting on the side of a flat bed without bedrails? 3  -CB     Moving to and from a bed to a chair (including a wheelchair)? 3  -CB     Standing up from a chair using your arms (e.g., wheelchair, bedside chair)? 3  -CB     Climbing 3-5 steps with a railing? 3  -CB     To walk in hospital room? 3  -CB     AM-PAC 6 Clicks Score (PT) 19  -CB     Highest Level of Mobility Goal 6 --> Walk 10 steps or more  -CB       Row Name 09/02/24 1243          Modified [place] Scale    Modified [place] Scale 1 - No significant disability despite symptoms.  Able to carry out all usual duties and activities.  -KR       Row Name 09/02/24 1307 09/02/24 1243       Functional Assessment    Outcome Measure Options AM-PAC 6 Clicks Basic Mobility (PT)  -CB AM-PAC 6 Clicks Daily Activity (OT);Modified [place]  -KR              User Key  (r) = Recorded By, (t) = Taken By, (c) = Cosigned By      Initials Name Provider Type    Nisa Meza, PT Physical Therapist    Silva Mcdaniel OT Occupational Therapist                                   PT Recommendation and Plan     Plan of Care Reviewed With: patient  Outcome Evaluation: Patient is a 77 yo male who presented with sinus bradycardia and weakness. PMHx includes diverticulitis requiring partial colectomy and now colostomy, HTN, DM, GERD. He lives in senior apartment with elevator access. He denies use of AD but then states uses SPC at needed. He is very [place]. Today pt performed bed mobility requiring SBA, STS requiring SBA, and ambulated 150ft and  35ft without AD requiring CGA then progressing to SBA. No overt LOB noted. Pt reports he is at his baseline mobility and plans to use his SPC when he gets home. PT to s/o and plan home at [place].     Time Calculation:         PT Charges       Row Name 09/02/24 1308             Time Calculation    Start Time 0926  -CB      Stop Time 0947  -CB      Time Calculation (min) 21 min  -CB      PT Received On 09/02/24  -CB         Time Calculation- PT    Total Timed Code Minutes- PT 10 minute(s)  -CB         Timed Charges    01543 - PT Therapeutic Activity Minutes 10  -CB         Total Minutes    Timed Charges Total Minutes 10  -CB       Total Minutes 10  -CB                User Key  (r) = Recorded By, (t) = Taken By, (c) = Cosigned By      Initials Name Provider Type    CB Nisa Chavarria, PT Physical Therapist                  Therapy Charges for Today       Code Description Service Date Service Provider Modifiers Qty    16639617435  PT THERAPEUTIC ACT EA 15 MIN 9/2/2024 Nisa Chavarria, PT GP 1    [id number]  PT EVAL LOW COMPLEXITY 3 9/2/2024 Nisa Chavarria, PT GP 1            PT G-Codes  Outcome Measure Options: AM-PAC 6 Clicks Basic Mobility (PT)  AM-PAC 6 Clicks Score (PT): 19  AM-PAC 6 Clicks Score (OT): 22  Modified [place] Scale: 1 - No significant disability despite symptoms.  Able to carry out all usual duties and activities.  PT Discharge Summary  Anticipated Discharge Disposition (PT): home    Nisa Chavarria PT  9/2/2024

## 2024-09-02 NOTE — THERAPY EVALUATION
Patient Name: Huber Valdovinos  : 1945    MRN: [id number]                              Today's Date: 2024       Admit Date: 2024    Visit Dx:     ICD-10-CM ICD-9-CM   1. Generalized weakness  R53.1 780.79   2. Sinus bradycardia  R00.1 427.89   3. Elevated troponin  R79.89 790.6   4. Elevated serum creatinine  R79.89 790.99   5. Hypervolemia, unspecified hypervolemia type  E87.70 276.69     Patient Active Problem List   Diagnosis    Generalized weakness    CHF (congestive heart failure)    CKD (chronic kidney disease)    Type 2 diabetes mellitus    GERD (gastroesophageal reflux disease)    Paroxysmal atrial fibrillation    Colostomy in place    Macular degeneration     Past Medical History:   Diagnosis Date    Anal fissure     Anemia     BPH (benign prostatic hyperplasia)     Chronic pain     CKD (chronic kidney disease)     Cryoglobulinemia     Degeneration of cervical intervertebral disc     Diverticulitis     Episodic cluster headache     GERD (gastroesophageal reflux disease)     Hemorrhage of gastrointestinal tract     Hypothyroidism     Legal blindness     Macular degeneration     Non-healing surgical wound     Obesity     Osteoarthritis     Panic attacks     Paroxysmal atrial fibrillation     Personal history of alcoholism     Post-thoracotomy pain     Seborrhea     Tobacco user     Urethral stricture     Ventral hernia      Past Surgical History:   Procedure Laterality Date    ABDOMINAL SURGERY      CARDIAC CATHETERIZATION      COLON SURGERY      COLONOSCOPY      GASTRECTOMY      HERNIA REPAIR      JOINT REPLACEMENT        General Information       Row Name 24 1237          OT Time and Intention    Document Type evaluation  -KR     Mode of Treatment occupational therapy  -KR       Row Name 24 1237          General Information    Patient Profile Reviewed yes  -KR     Prior Level of Function independent:;ADL's;all household mobility  -KR     Existing Precautions/Restrictions fall  -KR        Row Name 09/02/24 1237          Living Environment    People in Home alone  -       Row Name 09/02/24 1237          Stairs Within Home, Primary    Stairs, Within Home, Primary Elevator access  -       Row Name 09/02/24 1237          Cognition    Orientation Status (Cognition) oriented x 4  -       Row Name 09/02/24 1237          Safety Issues, Functional Mobility    Impairments Affecting Function (Mobility) endurance/activity tolerance  -               User Key  (r) = Recorded By, (t) = Taken By, (c) = Cosigned By      Initials Name Provider Type    Silva Mcdaniel OT Occupational Therapist                     Mobility/ADL's       Row Name 09/02/24 1238          Bed Mobility    Bed Mobility supine-sit;sit-supine  -     Supine-Sit [place] (Bed Mobility) standby assist  -     Sit-Supine [place] (Bed Mobility) standby assist  -       Row Name 09/02/24 1238          Transfers    Transfers sit-stand transfer  -       Row Name 09/02/24 1238          Sit-Stand Transfer    Sit-Stand [place] (Transfers) standby assist  -       Row Name 09/02/24 1238          Functional Mobility    Functional Mobility- Ind. Level 1 person;standby assist  -     Functional Mobility-Distance (Feet) --  within room and around nsg station  -       Row Name 09/02/24 1238          Activities of Daily Living    BADL Assessment/Intervention toileting  declined ADLs  -       Row Name 09/02/24 1238          Toileting Assessment/Training    Comment, (Toileting) reports he has been ambulating to the bathroom with nsg with no issues  -               User Key  (r) = Recorded By, (t) = Taken By, (c) = Cosigned By      Initials Name Provider Type    Silva Mcdaniel OT Occupational Therapist                   Obj/Interventions       Row Name 09/02/24 1241          Range of Motion Comprehensive    General Range of Motion bilateral upper extremity ROM WFL  -       Row Name 09/02/24 1241          Strength  Comprehensive (MMT)    Comment, General Manual Muscle Testing (MMT) Assessment --  BETTY WFL  -KR       Row Name 09/02/24 1241          Balance    Balance Assessment sitting static balance;sitting dynamic balance;standing static balance;standing dynamic balance  -KR     Static Sitting Balance supervision  -KR     Dynamic Sitting Balance supervision  -KR     Static Standing Balance supervision  -KR     Dynamic Standing Balance standby assist  -KR     Balance Interventions sitting;standing;occupation based/functional task  -KR               User Key  (r) = Recorded By, (t) = Taken By, (c) = Cosigned By      Initials Name Provider Type    Silva Mcdaniel OT Occupational Therapist                   Goals/Plan       Row Name 09/02/24 1243          Transfer Goal 1 (OT)    Activity/Assistive Device (Transfer Goal 1, OT) transfers, all  -KR     [place] Level/Cues Needed (Transfer Goal 1, OT) standby assist  -KR     Progress/Outcome (Transfer Goal 1, OT) goal met  -KR               User Key  (r) = Recorded By, (t) = Taken By, (c) = Cosigned By      Initials Name Provider Type    Silva Mcdaniel OT Occupational Therapist                   Clinical Impression       Row Name 09/02/24 1241          Pain Assessment    Pretreatment Pain Rating 0/10 - no pain  -KR     Posttreatment Pain Rating 0/10 - no pain  -KR       Row Name 09/02/24 1241          Plan of Care Review    Plan of Care Reviewed With patient  -KR     Outcome Evaluation Pt seen for OT melina this AM. Admitted with generalized weakness, bradycardia and dysuria. PMHx includes diverticulitis requiring a partial colectomy with now a colostomy. He reports he lives alone in a senior apartment and is independent with ADLs and mobility at baseline. He is very [place]. Gets his medications and colostomy supplies delivered to him. Today he performed bed mobility with SBA stood and performed mobility within the room and hallway to simulate household distances with SBA/CGA. Pt  reports he feels at his functional baseline. OT to sign off at this time. Anticipate return home at [place]  -KR       Row Name 09/02/24 1241          Therapy Assessment/Plan (OT)    Therapy Frequency (OT) evaluation only  -KR       Row Name 09/02/24 1241          Vital Signs    Pre Patient Position Supine  -KR     Intra Patient Position Standing  -KR     Post Patient Position Supine  -KR       Row Name 09/02/24 1241          Positioning and Restraints    Pre-Treatment Position in bed  -KR     Post Treatment Position bed  -KR     In Bed notified nsg;supine;call light within reach;encouraged to call for assist;exit alarm on  -KR               User Key  (r) = Recorded By, (t) = Taken By, (c) = Cosigned By      Initials Name Provider Type    Silva Mcdaniel OT Occupational Therapist                   Outcome Measures       Row Name 09/02/24 1243          How much help from another is currently needed...    Putting on and taking off regular lower body clothing? 3  -KR     Bathing (including washing, rinsing, and drying) 3  -KR     Toileting (which includes using toilet bed pan or urinal) 4  -KR     Putting on and taking off regular upper body clothing 4  -KR     Taking care of personal grooming (such as brushing teeth) 4  -KR     Eating meals 4  -KR     AM-PAC 6 Clicks Score (OT) 22  -KR       Row Name 09/02/24 1243          Modified [place] Scale    Modified [place] Scale 1 - No significant disability despite symptoms.  Able to carry out all usual duties and activities.  -KR       Row Name 09/02/24 1243          Functional Assessment    Outcome Measure Options AM-PAC 6 Clicks Daily Activity (OT);Modified [place]  -KR               User Key  (r) = Recorded By, (t) = Taken By, (c) = Cosigned By      Initials Name Provider Type    Silva Mcdaniel OT Occupational Therapist                    Occupational Therapy Education       Title: PT OT SLP Therapies (Done)       Topic: Occupational Therapy (Done)       Point: ADL training  (Done)       Description:   Instruct learner(s) on proper safety adaptation and remediation techniques during self care or transfers.   Instruct in proper use of assistive devices.                  Learning Progress Summary             Patient Acceptance, E, VU,DU by GONZALES at 9/2/2024 1244                         Point: Home exercise program (Done)       Description:   Instruct learner(s) on appropriate technique for monitoring, assisting and/or progressing therapeutic exercises/activities.                  Learning Progress Summary             Patient Acceptance, E, VU,DU by GOZNALES at 9/2/2024 1244                         Point: Precautions (Done)       Description:   Instruct learner(s) on prescribed precautions during self-care and functional transfers.                  Learning Progress Summary             Patient Acceptance, E, VU,DU by GONZALES at 9/2/2024 1244                                         User Key       Initials Effective Dates Name Provider Type Discipline    GONZALES 06/25/24 -  Silva Guevara OT Occupational Therapist OT                  OT Recommendation and Plan  Therapy Frequency (OT): evaluation only  Plan of Care Review  Plan of Care Reviewed With: patient  Outcome Evaluation: Pt seen for OT eval this AM. Admitted with generalized weakness, bradycardia and dysuria. PMHx includes diverticulitis requiring a partial colectomy with now a colostomy. He reports he lives alone in a senior apartment and is independent with ADLs and mobility at baseline. He is very [place]. Gets his medications and colostomy supplies delivered to him. Today he performed bed mobility with SBA stood and performed mobility within the room and hallway to simulate household distances with SBA/CGA. Pt reports he feels at his functional baseline. OT to sign off at this time. Anticipate return home at [place]     Time Calculation:         Time Calculation- OT       Row Name 09/02/24 1244             Time Calculation- OT    OT Start Time 0927  -GONZALES       OT Stop Time 0947  -KR      OT Time Calculation (min) 20 min  -KR      OT Received On 09/02/24  -KR         Timed Charges    22078 - OT Therapeutic Activity Minutes 10  -KR         Untimed Charges    OT Eval/Re-eval Minutes 10  -KR         Total Minutes    Timed Charges Total Minutes 10  -KR      Untimed Charges Total Minutes 10  -KR       Total Minutes 20  -KR                User Key  (r) = Recorded By, (t) = Taken By, (c) = Cosigned By      Initials Name Provider Type    Silva Mcdaniel OT Occupational Therapist                  Therapy Charges for Today       Code Description Service Date Service Provider Modifiers Qty    45375008267  OT THERAPEUTIC ACT EA 15 MIN 9/2/2024 Silva Guevara OT GO 1    02558018314 HC OT EVAL LOW COMPLEXITY 3 9/2/2024 Silva Guevara OT GO 1                 Silva Guevara OT  9/2/2024

## 2024-09-02 NOTE — PLAN OF CARE
Goal Outcome Evaluation:  Plan of Care Reviewed With: patient           Outcome Evaluation: Pt seen for OT melina this AM. Admitted with generalized weakness, bradycardia and dysuria. PMHx includes diverticulitis requiring a partial colectomy with now a colostomy. He reports he lives alone in a senior apartment and is independent with ADLs and mobility at baseline. He is very [place]. Gets his medications and colostomy supplies delivered to him. Today he performed bed mobility with SBA stood and performed mobility within the room and hallway to simulate household distances with SBA/CGA. Pt reports he feels at his functional baseline. OT to sign off at this time. Anticipate return home at [place]

## 2024-09-02 NOTE — THERAPY EVALUATION
Acute Care - Speech Language Pathology   Swallow Initial Evaluation [facility]     Patient Name: Huber Valdovinos  : 1945  MRN: [id number]  Today's Date: 2024               Admit Date: 2024    Visit Dx:     ICD-10-CM ICD-9-CM   1. Generalized weakness  R53.1 780.79   2. Sinus bradycardia  R00.1 427.89   3. Elevated troponin  R79.89 790.6   4. Elevated serum creatinine  R79.89 790.99   5. Hypervolemia, unspecified hypervolemia type  E87.70 276.69     Patient Active Problem List   Diagnosis    Generalized weakness    CHF (congestive heart failure)    CKD (chronic kidney disease)    Type 2 diabetes mellitus    GERD (gastroesophageal reflux disease)    Paroxysmal atrial fibrillation    Colostomy in place    Macular degeneration     Past Medical History:   Diagnosis Date    Anal fissure     Anemia     BPH (benign prostatic hyperplasia)     Chronic pain     CKD (chronic kidney disease)     Cryoglobulinemia     Degeneration of cervical intervertebral disc     Diverticulitis     Episodic cluster headache     GERD (gastroesophageal reflux disease)     Hemorrhage of gastrointestinal tract     Hypothyroidism     Legal blindness     Macular degeneration     Non-healing surgical wound     Obesity     Osteoarthritis     Panic attacks     Paroxysmal atrial fibrillation     Personal history of alcoholism     Post-thoracotomy pain     Seborrhea     Tobacco user     Urethral stricture     Ventral hernia      Past Surgical History:   Procedure Laterality Date    ABDOMINAL SURGERY      CARDIAC CATHETERIZATION      COLON SURGERY      COLONOSCOPY      GASTRECTOMY      HERNIA REPAIR      JOINT REPLACEMENT         SLP Recommendation and Plan  SLP Swallowing Diagnosis: swallow WFL/no suspected pharyngeal impairment, esophageal dysphagia (24 1200)  SLP Diet Recommendation: regular textures, thin liquids (24 1200)  Recommended Precautions and Strategies: general aspiration precautions, reflux precautions  "(09/02/24 1200)  SLP Rec. for Method of Medication Administration: meds whole, as tolerated (09/02/24 1200)     Monitor for Signs of Aspiration: yes, notify SLP if any concerns (09/02/24 1200)  Recommended Diagnostics: No further SLP services recommended (09/02/24 1200)     Anticipated Discharge Disposition (SLP): No further SLP services warranted (09/02/24 1200)     Therapy Frequency (Swallow): evaluation only (09/02/24 1200)     Oral Care Recommendations: Oral Care BID/PRN (09/02/24 1200)              Plan of Care Reviewed With: patient  Outcome Evaluation: Clinical swallow evaluation completed this date. Per order, \"pt choked twice\" previous date, however pt reports he did not choke and rather had made himself vomit as he could feel that the food was not going down. He contributes difficulty swallowing to nissen fundoplication. RN giving meds whole with water at start of session. Pt took multiple pills at one time, no overt s/s of aspiration. Pt then self fed PO trials of thin liquid by straw, mixed, and regular solid. Timely and functional mastication. Suspect good bolus control and timely oral transit. Laryngeal elevation subjectivley functional. No overt s/s of aspiration such as cough, throat clear, or wet vocal quality at any time. Recommend regular diet and thin liquids. Meds whole as able. General aspiration precautions. Defer to GI for nissen management. SLP to sign off as oropharyngeal swallow appears functional. Please re consult as indicated.      SWALLOW EVALUATION (Last 72 Hours)       SLP Adult Swallow Evaluation       Row Name 09/02/24 1200       Rehab Evaluation    Document Type evaluation  -HF    Subjective Information no complaints  -HF    Patient Observations alert;agree to therapy  -HF    Patient Effort adequate  -HF       General Information    Patient Profile Reviewed yes  -HF    Pertinent History Of Current Problem H&P: \"Mr. Valdovinos is a 78 y.o. 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 He presents with generalized weakness, bradycardia, dysuria\". SLP consulted d/t \"pt choked twice today\".  -HF    Current Method of Nutrition regular textures;thin liquids  -HF    Precautions/Limitations, Vision WFL;for purposes of eval  -HF    Precautions/Limitations, Hearing hearing impairment, bilaterally  per RN, hearing aid batteries need to be charged  -HF    Prior Level of Function-Communication WFL  -HF    Prior Level of Function-Swallowing no diet consistency restrictions  -HF    Plans/Goals Discussed with patient;agreed upon  -HF    Barriers to Rehab none identified  -HF       Pain    Additional Documentation Pain Scale: FACES Pre/Post-Treatment (Group)  -HF       Pain Scale: FACES Pre/Post-Treatment    Pain: FACES Scale, Pretreatment 0-->no hurt  -HF       Oral Motor Structure and Function    Dentition Assessment missing teeth  -HF    Secretion Management WNL/WFL  -HF    Mucosal Quality moist, healthy  -HF       Oral Musculature and Cranial Nerve Assessment    Oral Motor General Assessment WFL  -HF       General Eating/Swallowing Observations    Respiratory Support Currently in Use room air  -HF    Eating/Swallowing Skills self-fed;appropriate self-feeding skills observed  -HF    Positioning During Eating upright in bed  -HF    Utensils Used spoon;cup;straw  -HF    Consistencies Trialed regular textures;mixed consistency;thin liquids  -HF       Respiratory    Respiratory Status WFL  -HF       Clinical Swallow Eval    Clinical Swallow Evaluation Summary Clinical swallow evaluation completed " "this date. Per order, \"pt choked twice\" previous date, however pt reports he did not choke and rather had made himself vomit as he could feel that the food was not going down. He contributes difficulty swallowing to nissen fundoplication. RN giving meds whole with water at start of session. Pt took multiple pills at one time, no overt s/s of aspiration. Pt then self fed PO trials of thin liquid by straw, mixed, and regular solid. Timely and functional mastication. Suspect good bolus control and timely oral transit. Laryngeal elevation subjectivley functional. No overt s/s of aspiration such as cough, throat clear, or wet vocal quality at any time. Recommend regular diet and thin liquids. Meds whole as able. General aspiration precautions. Defer to GI for nissen management. SLP to sign off as oropharyngeal swallow appears functional. Please re consult as indicated.  -HF       SLP Evaluation Clinical Impression    SLP Swallowing Diagnosis swallow WFL/no suspected pharyngeal impairment;esophageal dysphagia  -HF    Functional Impact risk of aspiration/pneumonia  2/2 esophageal dysphagia  -HF       SLP Treatment Clinical Impressions    Care Plan Review evaluation/treatment results reviewed;care plan/treatment goals reviewed;patient/other agree to care plan  -HF       Recommendations    Therapy Frequency (Swallow) evaluation only  -HF    SLP Diet Recommendation regular textures;thin liquids  -HF    Recommended Diagnostics No further SLP services recommended  -HF    Recommended Precautions and Strategies general aspiration precautions;reflux precautions  -HF    Oral Care Recommendations Oral Care BID/PRN  -HF    SLP Rec. for Method of Medication Administration meds whole;as tolerated  -HF    Monitor for Signs of Aspiration yes;notify SLP if any concerns  -HF    Anticipated Discharge Disposition (SLP) No further SLP services warranted  -HF              User Key  (r) = Recorded By, (t) = Taken By, (c) = Cosigned By      " Initials Name Effective Dates    Randa Slater SLP 08/01/23 -                     EDUCATION  The patient has been educated in the following areas:   Dysphagia (Swallowing Impairment) Oral Care/Hydration.                Time Calculation:    Time Calculation- SLP       Row Name 09/02/24 1211             Time Calculation- SLP    SLP Start Time 1030  -HF         Untimed Charges    SLP Eval/Re-eval  ST Eval Oral Pharyng Swallow - 28998  -HF                User Key  (r) = Recorded By, (t) = Taken By, (c) = Cosigned By      Initials Name Provider Type    Randa Slater SLP Speech and Language Pathologist                    Therapy Charges for Today       Code Description Service Date Service Provider Modifiers Qty    90866914260 HC ST EVAL ORAL PHARYNG SWALLOW 3 9/2/2024 Randa Burnett SLP GN 1                 GEORGE Bell  9/2/2024

## 2024-09-02 NOTE — PROGRESS NOTES
Name: Huber Valdovinos ADMIT: 2024   : 1945  PCP: Luis Slaughter MD    MRN: [id number] LOS: 0 days   AGE/SEX: 78 y.o. male  ROOM: [facility]     Subjective   Subjective     No events overnight. He complains of low back pain from the bed. His urinalysis came positive for a UTI. Heart rate is improved       Objective   Objective   Vital Signs  Temp:  [97.3 °F (36.3 °C)-98.1 °F (36.7 °C)] 98.1 °F (36.7 °C)  Heart Rate:  [44-76] 76  Resp:  [18-22] 18  BP: ()/(52-89) 140/61  SpO2:  [93 %-100 %] 93 %  on   ;   Device (Oxygen Therapy): room air  Body mass index is 28.86 kg/m².  Physical Exam  Constitutional:       General: He is not in acute distress.     Appearance: He is ill-appearing. He is not toxic-appearing.   Cardiovascular:      Rate and Rhythm: Normal rate and regular rhythm.      Heart sounds: Normal heart sounds.   Pulmonary:      Effort: Pulmonary effort is normal.      Breath sounds: Normal breath sounds.   Abdominal:      General: Bowel sounds are normal.      Palpations: Abdomen is soft.      Comments: Large midline abdominal wound that has healed and a colostomy   Musculoskeletal:         General: No tenderness.      Right lower leg: No edema.      Left lower leg: No edema.   Neurological:      General: No focal deficit present.      Mental Status: He is alert and oriented to person, place, and time.   Psychiatric:         Mood and Affect: Affect is labile and angry.         Results Review     I reviewed the patient's new clinical results.  Results from last 7 days   Lab Units 24  0811 24  0004 24  1624 24  0710   WBC 10*3/mm3 6.35  --   --  5.79   HEMOGLOBIN g/dL 9.0* 8.6* 8.2* 8.1*   PLATELETS 10*3/mm3 347  --   --  342     Results from last 7 days   Lab Units 24  0811 24  1623 24  0710   SODIUM mmol/L 139 138 137   POTASSIUM mmol/L 4.1 4.0 4.3   CHLORIDE mmol/L 95* 96* 97*   CO2 mmol/L 29.4* 30.0* 30.0*   BUN mg/dL 28* 31* 27*  "  CREATININE mg/dL 2.31* 2.56* 2.16*   GLUCOSE mg/dL 80 119* 161*   Estimated Creatinine Clearance: 27.2 mL/min (A) (by C-G formula based on SCr of 2.31 mg/dL (H)).  Results from last 7 days   Lab Units 09/01/24  0710   ALBUMIN g/dL 3.9   BILIRUBIN mg/dL 1.0   ALK PHOS U/L 110   AST (SGOT) U/L 19   ALT (SGPT) U/L 8     Results from last 7 days   Lab Units 09/02/24  0811 09/01/24  1623 09/01/24  0710   CALCIUM mg/dL 9.7 9.3 9.4   ALBUMIN g/dL  --   --  3.9   MAGNESIUM mg/dL  --   --  2.4     No results found for: \"COVID19\"  Hemoglobin A1C   Date/Time Value Ref Range Status   09/02/2024 0811 5.80 (H) 4.80 - 5.60 % Final     Glucose   Date/Time Value Ref Range Status   09/02/2024 1043 105 70 - 130 mg/dL Final   09/02/2024 0604 94 70 - 130 mg/dL Final   09/01/2024 2028 99 70 - 130 mg/dL Final   09/01/2024 1611 123 70 - 130 mg/dL Final   09/01/2024 1240 127 70 - 130 mg/dL Final   09/01/2024 0719 180 (H) 70 - 130 mg/dL Final       Adult Transthoracic Echo Complete W/ Cont if Necessary Per Protocol    Left ventricular systolic function is normal. Calculated left   ventricular EF = 55.5%    Left ventricular diastolic function is consistent with (grade I)   impaired relaxation.    The left atrial cavity is moderately dilated.    Left atrial volume is moderately increased.    There is calcification of the aortic valve.    Mild aortic valve regurgitation is present    Mild mitral valve regurgitation is present.    Moderate tricuspid valve regurgitation is present.    Estimated right ventricular systolic pressure from tricuspid   regurgitation is normal (<35 mmHg). Calculated right ventricular systolic   pressure from tricuspid regurgitation is 28 mmHg.  CT Head Without Contrast  Narrative: CT HEAD WO CONTRAST-     DATE OF EXAM: 9/1/2024 8:21 AM     INDICATION: Generalized weakness.     COMPARISON: None available.     TECHNIQUE: Multiple contiguous axial images were acquired through the  head without the intravenous " administration of contrast. Reformatted  coronal and sagittal sequences were also reviewed. Radiation dose  reduction techniques were utilized, including automated exposure control  and exposure modulation based on body size.     FINDINGS:  No acute intracranial hemorrhage or mass/mass effect. The ventricles and  sulci are appropriate for age. The basilar cisterns are patent. Patchy  periventricular and subcortical white matter low-attenuation,  statistically representing age-indeterminate small vessel ischemic  changes. Gray-white matter differentiation is otherwise grossly  maintained. Likely benign focal calcification in the brent. Mild mucosal  thickening in the partially imaged left maxillary sinus. Limited imaging  of the orbits and mastoid air cells is unremarkable. No acute osseous  abnormality is identified.     Impression:    1. No acute intracranial hemorrhage or mass/mass effect.  2. Patchy periventricular and subcortical white matter low-attenuation,  statistically representing age-indeterminate small vessel ischemic  changes.  3. Likely benign focal calcification in the brent. Could consider further  evaluation with MRI if there is persistent clinical concern.     This report was finalized on 9/1/2024 8:42 AM by Abimael Rivera MD on  Workstation: [id number]     XR Chest 1 View  Narrative: XR CHEST 1 VW-     DATE OF EXAM: 9/1/2024 7:22 AM     INDICATION: generalized weakness.     COMPARISON: None available.     TECHNIQUE: A single portable AP view of the chest was obtained.     FINDINGS:  Lordotic positioning. Overlying artifact. Low lung volumes with  ill-defined right greater than left predominantly perihilar and basilar  opacities in both lungs, obscuring the right hemidiaphragm and each  costophrenic angle. Tiny nodules in both lungs likely represent tiny  calcified granulomas. No pneumothorax. Enlargement of the cardiac  silhouette, likely accentuated by technique. No acute osseous  abnormality is  identified.     Impression:    1. Low lung volumes with ill-defined right greater than left predominant  perihilar and basilar opacities in both lungs, which could represent  small bilateral pleural effusions with underlying atelectasis and/or  pneumonia and possible superimposed mild pulmonary edema.  2. Cardiomegaly, likely accentuated by technique.     This report was finalized on 9/1/2024 7:54 AM by Abimael Rivera MD on  Workstation: [id number]       Scheduled Medications  acetaminophen, 650 mg, Oral, Once  bumetanide, 2 mg, Oral, Daily  cefTRIAXone, 1,000 mg, Intravenous, Q24H  cetirizine, 5 mg, Oral, Once  ferric gluconate, 250 mg, Intravenous, Once  finasteride, 5 mg, Oral, Daily  gabapentin, 300 mg, Oral, TID  insulin lispro, 2-7 Units, Subcutaneous, 4x Daily AC & at Bedtime  rivaroxaban, 15 mg, Oral, Daily With Dinner  Scopolamine, 1 patch, Transdermal, Q72H    Infusions   Diet  Diet: Cardiac; Low Sodium (2g); Fluid Consistency: Thin (IDDSI 0)       Assessment/Plan     Active Hospital Problems    Diagnosis  POA    **Generalized weakness [R53.1]  Yes    CHF (congestive heart failure) [I50.9]  Yes    CKD (chronic kidney disease) [N18.9]  Yes    Type 2 diabetes mellitus [E11.9]  Yes    GERD (gastroesophageal reflux disease) [K21.9]  Yes    Paroxysmal atrial fibrillation [I48.0]  Yes    Colostomy in place [Z93.3]  Not Applicable    Macular degeneration [H35.30]  Yes      Resolved Hospital Problems   No resolved problems to display.       78 y.o. male admitted with Generalized weakness.    Mr. Valdovinos is a 78 y.o. 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 He presents with generalized weakness, bradycardia, dysuria     Urinary tract infection-start ceftriaxone. Follow urine cultures  Sinus bradycardia-tsh was slightly elevated, but free t4 was normal. His echocardiogram was mostly unremarkable. Heart rate has improved since yesterday. Follow cardiology recommendations  Iron deficiency anemia-hgb is at baseline since at least march. Ok to top H&H checks. Administer IV iron (he tells me he's intolerant of oral iron)  Chronic diastolic heart failure-I think the findings on chest xray are more indicative of his interstitial lung disease given his lack of peripheral overload or respiratory symptoms. Continue home bumex  CKD 3B-creatinine appears to be at baseline  Type 2 diabetes-A1c 5.8. continue sliding scale  Paroxysmal afib-continue xarelto. Not on rate control at baseline  Xarelto (home med) for DVT prophylaxis.  Full code.  Discussed with patient, family, and consulting provider.  Anticipate discharge  TBD  in 1-2 days.      Vargas Cuello MD  [facility]  09/02/24  11:09 EDT    I wore protective equipment throughout this patient encounter including a face mask, gloves and protective eyewear.  Hand hygiene was performed before donning protective equipment and after removal when leaving the room.

## 2024-09-02 NOTE — PLAN OF CARE
Goal Outcome Evaluation:  Plan of Care Reviewed With: patient           Outcome Evaluation: Patient is a 77 yo male who presented with sinus bradycardia and weakness. PMHx includes diverticulitis requiring partial colectomy and now colostomy, HTN, DM, GERD. He lives in senior apartment with elevator access. He denies use of AD but then states uses SPC at needed. He is very Elem. Today pt performed bed mobility requiring SBA, STS requiring SBA, and ambulated 150ft and 35ft without AD requiring CGA then progressing to SBA. No overt LOB noted. Pt reports he is at his baseline mobility and plans to use his SPC when he gets home. PT to s/o and plan home at [place].      Anticipated Discharge Disposition (PT): home

## 2024-09-02 NOTE — CONSULTS
Nutrition Services    Patient Name:  Huber Valdovinos  YOB: 1945  MRN: [id number]  Admit Date:  9/1/2024  Assessment Date:  09/02/24    Summary: Nutrition Consult for wt loss  This is a 79 yo male  admitted for weakness, and bradycardia.  He has a hx of  CHF, CKD, DM, GERD, afib, partial colectomy with colostomy.  Pt was laying in bed with breakfast tray on bedside table.  He was able to eat some of his eggs but states it does not have much taste. I offered to get him something else to eat but he declined.  He reported that he has lost wt 100lb or so over a year with most of it being fluid which he is happy about.  He is not interested in any supplements at this time. Will have dietary go over lunch and dinner options to get him foods he likes.    Plan/Recommendation  Good po intake with all meals.  Honor food preferences  RD to follow      CLINICAL NUTRITION ASSESSMENT      Reason for Assessment Physician Consult, Unintentional Weight Loss     Diagnosis/Problem   Weakness, bradycardia   Medical/Surgical History Past Medical History:   Diagnosis Date    Anal fissure     Anemia     BPH (benign prostatic hyperplasia)     Chronic pain     Cryoglobulinemia     Degeneration of cervical intervertebral disc     Diverticulitis     Episodic cluster headache     GERD (gastroesophageal reflux disease)     Hemorrhage of gastrointestinal tract     Hypothyroidism     Legal blindness     Macular degeneration     Non-healing surgical wound     Obesity     Osteoarthritis     Panic attacks     Personal history of alcoholism     Post-thoracotomy pain     Seborrhea     Tobacco user     Urethral stricture     Ventral hernia        Past Surgical History:   Procedure Laterality Date    ABDOMINAL SURGERY      CARDIAC CATHETERIZATION      COLON SURGERY      COLONOSCOPY      GASTRECTOMY      HERNIA REPAIR      JOINT REPLACEMENT          Anthropometrics        Current Height  Current Weight  BMI kg/m2 Height: 167.6 cm  "(66\")  Weight: 81.1 kg (178 lb 12.7 oz) (09/02/24 0515)  Body mass index is 28.86 kg/m².   Adjusted BMI (if applicable)    BMI Category Overweight (25 - 29.9)   Ideal Body Weight (IBW) 140lb   Usual Body Weight (UBW) 228lb   Weight Trend Loss   Weight History Wt Readings from Last 30 Encounters:   09/02/24 0515 81.1 kg (178 lb 12.7 oz)   09/01/24 1445 82.1 kg (181 lb)   09/01/24 0711 82.4 kg (181 lb 10.5 oz)   09/01/24 0710 78 kg (172 lb)      --  Labs       Pertinent Labs    Results from last 7 days   Lab Units 09/01/24  1623 09/01/24  0710   SODIUM mmol/L 138 137   POTASSIUM mmol/L 4.0 4.3   CHLORIDE mmol/L 96* 97*   CO2 mmol/L 30.0* 30.0*   BUN mg/dL 31* 27*   CREATININE mg/dL 2.56* 2.16*   CALCIUM mg/dL 9.3 9.4   BILIRUBIN mg/dL  --  1.0   ALK PHOS U/L  --  110   ALT (SGPT) U/L  --  8   AST (SGOT) U/L  --  19   GLUCOSE mg/dL 119* 161*     Results from last 7 days   Lab Units 09/02/24  0004 09/01/24  1624 09/01/24  0710   MAGNESIUM mg/dL  --   --  2.4   HEMOGLOBIN g/dL 8.6*   < > 8.1*   HEMATOCRIT % 27.5*   < > 27.8*   WBC 10*3/mm3  --   --  5.79   ALBUMIN g/dL  --   --  3.9    < > = values in this interval not displayed.     Results from last 7 days   Lab Units 09/01/24  0710   INR  2.32*   APTT seconds 37.7*   PLATELETS 10*3/mm3 342     No results found for: \"COVID19\"  No results found for: \"HGBA1C\"       Medications           Scheduled Medications bumetanide, 2 mg, Oral, Daily  finasteride, 5 mg, Oral, Daily  gabapentin, 300 mg, Oral, TID  insulin lispro, 2-7 Units, Subcutaneous, 4x Daily AC & at Bedtime  rivaroxaban, 15 mg, Oral, Daily With Dinner  Scopolamine, 1 patch, Transdermal, Q72H       Infusions     PRN Medications   senna-docusate sodium **AND** polyethylene glycol **AND** bisacodyl **AND** bisacodyl    dextrose    dextrose    glucagon (human recombinant)    melatonin    nitroglycerin    ondansetron ODT **OR** ondansetron    sodium chloride    [COMPLETED] Insert Peripheral IV **AND** sodium " chloride    tiZANidine    traMADol     Physical Findings          General Findings agitated, alert, oriented   Oral/Mouth Cavity tooth or teeth missing does not want soft/grd foods   Edema  no edema   Gastrointestinal abdominal pain, last bowel movement: 9/1  indigestion   Skin  skin intact   Tubes/Drains/Lines colostomy   NFPE No clinical signs of muscle wasting or fat loss   --  Current Nutrition Orders & Evaluation of Intake       Oral Nutrition     Food Allergies NKFA   Current PO Diet Diet: Cardiac; Low Sodium (2g); Fluid Consistency: Thin (IDDSI 0)   Supplement n/a   PO Evaluation     % PO Intake 25% for breakfast this am, appetite ok at home    Factors Affecting Intake: dislikes hospital food   --  PES STATEMENT / NUTRITION DIAGNOSIS      Nutrition Dx Problem  Problem: Unintentional Weight Loss  Etiology: Medical Diagnosis - weakness, bradycardia    Signs/Symptoms:      NUTRITION INTERVENTION / PLAN OF CARE      Intervention Goal(s) Maintain nutrition status, Maintain weight, and PO intake goal %: 75+         RD Intervention/Action Interview for preferences, Supplement offered/declined, Encourage intake, Continue to monitor, and Care plan reviewed   --      Prescription/Orders:       PO Diet       Supplements       Enteral Nutrition       Parenteral Nutrition    New Prescription Ordered? Continue same per protocol   --      Monitor/Evaluation Per protocol, PO intake, Weight, Symptoms   Discharge Plan/Needs Pending clinical course   --    RD to follow per protocol.      Electronically signed by:  Sofi Gillespie RD  09/02/24 07:39 EDT

## 2024-09-02 NOTE — CONSULTS
Date of Hospital Visit: 24  Encounter Provider: Daniele Eason MD  Place of Service: [facility] CARDIOLOGY  Patient Name: Huber Valdovinos  :1945  Referral Provider: Dr Cuello    Chief complaint: Bradycardia    History of Present Illness    Mr. Valdovinos is a 77yo with PAF, chronic HFpEF, CKD (unknown baseline), and HTN who presents with weakness. He receives his care at the [facility] and we have no records.     He reports generalized weakness for the last year as well as 100 pound unintentional weight loss due to loss of appetite. This morning he felt more weak and off balance. He wasn't lightheaded or syncopal. He denies palpitations. EMS was called and his HR was ~40, which it was in the ED here. He has steadily come up overnight. He's in SR/ST with PVCs.    He gets easily agitated with questions. He is a tangential historian. He says the only time he has passed out was when glipizide made him hypoglycemic and since it's been stopped, he hasn't had any events. He denies chest pain or dyspnea. He has chronic leg swelling which is stable.     ECHO 24    Left ventricular systolic function is normal. Calculated left ventricular EF = 55.5%    Left ventricular diastolic function is consistent with (grade I) impaired relaxation.    The left atrial cavity is moderately dilated.    Left atrial volume is moderately increased.    There is calcification of the aortic valve.    Mild aortic valve regurgitation is present    Mild mitral valve regurgitation is present.    Moderate tricuspid valve regurgitation is present.    Estimated right ventricular systolic pressure from tricuspid regurgitation is normal (<35 mmHg). Calculated right ventricular systolic pressure from tricuspid regurgitation is 28 mmHg.      Past Medical History:   Diagnosis Date    Anal fissure     Anemia     BPH (benign prostatic hyperplasia)     Chronic pain     CKD (chronic kidney disease)     Cryoglobulinemia      Degeneration of cervical intervertebral disc     Diverticulitis     Episodic cluster headache     GERD (gastroesophageal reflux disease)     Hemorrhage of gastrointestinal tract     Hypothyroidism     Legal blindness     Macular degeneration     Non-healing surgical wound     Obesity     Osteoarthritis     Panic attacks     Paroxysmal atrial fibrillation     Personal history of alcoholism     Post-thoracotomy pain     Seborrhea     Tobacco user     Urethral stricture     Ventral hernia        Past Surgical History:   Procedure Laterality Date    ABDOMINAL SURGERY      CARDIAC CATHETERIZATION      COLON SURGERY      COLONOSCOPY      GASTRECTOMY      HERNIA REPAIR      JOINT REPLACEMENT         Prior to Admission medications    Medication Sig Start Date End Date Taking? Authorizing Provider   bumetanide (BUMEX) 2 MG tablet Take 1 tablet by mouth Daily.   Yes Marquise Min MD   finasteride (PROSCAR) 5 MG tablet Take 1 tablet by mouth Daily.   Yes Marquise Min MD   gabapentin (NEURONTIN) 300 MG capsule Take 1 capsule by mouth 3 (Three) Times a Day.   Yes Marquise Min MD   rivaroxaban (XARELTO) 15 MG tablet Take 1 tablet by mouth Daily With Dinner.   Yes Marquise Min MD   Scopolamine 1 MG/3DAYS patch Place 1 patch on the skin as directed by provider Every 72 (Seventy-Two) Hours.   Yes Marquise Min MD   tiZANidine (ZANAFLEX) 4 MG tablet Take 1 tablet by mouth At Night As Needed for Muscle Spasms.   Yes Marquise Min MD   traMADol (ULTRAM) 50 MG tablet Take 1 tablet by mouth Every 8 (Eight) Hours As Needed for Moderate Pain.   Yes Marquise Min MD       Social History     Socioeconomic History    Marital status: Single   Tobacco Use    Smoking status: Every Day     Current packs/day: 0.50     Types: Cigarettes    Smokeless tobacco: Never   Vaping Use    Vaping status: Never Used   Substance and Sexual Activity    Alcohol use: Never    Drug use: Never       History  "reviewed. No pertinent family history.    Review of Systems   Constitutional:  Positive for fatigue.   Neurological:  Positive for weakness.        Objective:     Vitals:    24 2312 24 2314 24 0515 24 0703   BP: 143/89 139/58  140/61   BP Location: Right arm   Right arm   Patient Position: Lying   Lying   Pulse: 74 72  76   Resp: 20   18   Temp: 97.5 °F (36.4 °C)   98.1 °F (36.7 °C)   TempSrc: Oral   Oral   SpO2: 97% 99%  93%   Weight:   81.1 kg (178 lb 12.7 oz)    Height:         Body mass index is 28.86 kg/m².    Last Weight and Admission Weight        24  0515   Weight: 81.1 kg (178 lb 12.7 oz)     Flowsheet Rows      Flowsheet Row First Filed Value   Admission Height 167.6 cm (66\") Documented at 2024 0710   Admission Weight 78 kg (172 lb) Documented at 2024 0710              Intake/Output Summary (Last 24 hours) at 2024 1116  Last data filed at 2024 0648  Gross per 24 hour   Intake 840 ml   Output 1920 ml   Net -1080 ml         Physical Exam  Vitals reviewed.   Constitutional:       Appearance: He is well-developed.   HENT:      Head: Normocephalic.      Nose: Nose normal.      Mouth/Throat:      Comments: Poor dentition  Eyes:      Conjunctiva/sclera: Conjunctivae normal.   Neck:      Vascular: No JVD.   Cardiovascular:      Rate and Rhythm: Tachycardia present. Occasional      Heart sounds: Normal heart sounds.   Pulmonary:      Effort: Pulmonary effort is normal.      Breath sounds: Normal breath sounds.   Abdominal:      Palpations: Abdomen is soft.      Tenderness: There is no abdominal tenderness.   Musculoskeletal:         General: Normal range of motion.      Cervical back: Normal range of motion.      Right lower le+      Left lower le+   Skin:     General: Skin is warm and dry.   Neurological:      General: No focal deficit present.      Mental Status: He is alert.   Psychiatric:         Mood and Affect: Affect is angry.                 Lab " Review:                Results from last 7 days   Lab Units 09/02/24  0811   SODIUM mmol/L 139   POTASSIUM mmol/L 4.1   CHLORIDE mmol/L 95*   CO2 mmol/L 29.4*   BUN mg/dL 28*   CREATININE mg/dL 2.31*   GLUCOSE mg/dL 80   CALCIUM mg/dL 9.7     Results from last 7 days   Lab Units 09/01/24  1012 09/01/24  0710   HSTROP T ng/L 110* 128*     Results from last 7 days   Lab Units 09/02/24  0811   WBC 10*3/mm3 6.35   HEMOGLOBIN g/dL 9.0*   HEMATOCRIT % 29.2*   PLATELETS 10*3/mm3 347     Results from last 7 days   Lab Units 09/01/24  0710   INR  2.32*   APTT seconds 37.7*         Results from last 7 days   Lab Units 09/01/24  0710   MAGNESIUM mg/dL 2.4                   I personally viewed and interpreted the patient's EKG/Telemetry data    Assessment/Plan:     1. Generalized weakness/acute on chronic  2. Sinus vs ectopic atrial bradycardia -- resolved  3. CKD, unknown if this is his baseline or if this is DIANE  4. PAF, on rivaroxaban  5. PVCs  6. Chronic HFpEF, stable  7. DM    At this point, his heart rate has normalized. He certainly has some conduction disease, with an IVCD and low HR that may correlate with acute worsening of his symptoms. His HR has normalized at present. We will get an EP opinion tomorrow to see if they feel if there is benefit to any intervention vs just monitoring longer.

## 2024-09-03 ENCOUNTER — READMISSION MANAGEMENT (OUTPATIENT)
Dept: CALL CENTER | Facility: HOSPITAL | Age: 79
End: 2024-09-03
Payer: OTHER GOVERNMENT

## 2024-09-03 ENCOUNTER — APPOINTMENT (OUTPATIENT)
Dept: CARDIOLOGY | Facility: HOSPITAL | Age: 79
End: 2024-09-03
Payer: OTHER GOVERNMENT

## 2024-09-03 VITALS
OXYGEN SATURATION: 96 % | BODY MASS INDEX: 28.73 KG/M2 | HEIGHT: 66 IN | HEART RATE: 88 BPM | DIASTOLIC BLOOD PRESSURE: 54 MMHG | WEIGHT: 178.79 LBS | TEMPERATURE: 98.3 F | SYSTOLIC BLOOD PRESSURE: 113 MMHG | RESPIRATION RATE: 18 BRPM

## 2024-09-03 PROBLEM — N39.0 ACUTE UTI (URINARY TRACT INFECTION): Status: ACTIVE | Noted: 2024-09-03

## 2024-09-03 PROBLEM — R00.1 BRADYCARDIA: Status: ACTIVE | Noted: 2024-09-03

## 2024-09-03 LAB
ANION GAP SERPL CALCULATED.3IONS-SCNC: 11 MMOL/L (ref 5–15)
BACTERIA SPEC AEROBE CULT: ABNORMAL
BASOPHILS # BLD AUTO: 0.02 10*3/MM3 (ref 0–0.2)
BASOPHILS NFR BLD AUTO: 0.3 % (ref 0–1.5)
BUN SERPL-MCNC: 23 MG/DL (ref 8–23)
BUN/CREAT SERPL: 11.3 (ref 7–25)
CALCIUM SPEC-SCNC: 9.5 MG/DL (ref 8.6–10.5)
CHLORIDE SERPL-SCNC: 98 MMOL/L (ref 98–107)
CO2 SERPL-SCNC: 29 MMOL/L (ref 22–29)
CREAT SERPL-MCNC: 2.03 MG/DL (ref 0.76–1.27)
DEPRECATED RDW RBC AUTO: 46.4 FL (ref 37–54)
EGFRCR SERPLBLD CKD-EPI 2021: 33.4 ML/MIN/1.73
EOSINOPHIL # BLD AUTO: 0.06 10*3/MM3 (ref 0–0.4)
EOSINOPHIL NFR BLD AUTO: 0.9 % (ref 0.3–6.2)
ERYTHROCYTE [DISTWIDTH] IN BLOOD BY AUTOMATED COUNT: 16.3 % (ref 12.3–15.4)
GLUCOSE BLDC GLUCOMTR-MCNC: 79 MG/DL (ref 70–130)
GLUCOSE BLDC GLUCOMTR-MCNC: 91 MG/DL (ref 70–130)
GLUCOSE SERPL-MCNC: 79 MG/DL (ref 65–99)
HCT VFR BLD AUTO: 28 % (ref 37.5–51)
HGB BLD-MCNC: 8.8 G/DL (ref 13–17.7)
IMM GRANULOCYTES # BLD AUTO: 0.04 10*3/MM3 (ref 0–0.05)
IMM GRANULOCYTES NFR BLD AUTO: 0.6 % (ref 0–0.5)
LYMPHOCYTES # BLD AUTO: 0.55 10*3/MM3 (ref 0.7–3.1)
LYMPHOCYTES NFR BLD AUTO: 8.5 % (ref 19.6–45.3)
MCH RBC QN AUTO: 25.1 PG (ref 26.6–33)
MCHC RBC AUTO-ENTMCNC: 31.4 G/DL (ref 31.5–35.7)
MCV RBC AUTO: 80 FL (ref 79–97)
MONOCYTES # BLD AUTO: 0.64 10*3/MM3 (ref 0.1–0.9)
MONOCYTES NFR BLD AUTO: 9.8 % (ref 5–12)
NEUTROPHILS NFR BLD AUTO: 5.19 10*3/MM3 (ref 1.7–7)
NEUTROPHILS NFR BLD AUTO: 79.9 % (ref 42.7–76)
NRBC BLD AUTO-RTO: 0 /100 WBC (ref 0–0.2)
PLATELET # BLD AUTO: 332 10*3/MM3 (ref 140–450)
PMV BLD AUTO: 8.7 FL (ref 6–12)
POTASSIUM SERPL-SCNC: 3.4 MMOL/L (ref 3.5–5.2)
RBC # BLD AUTO: 3.5 10*6/MM3 (ref 4.14–5.8)
SODIUM SERPL-SCNC: 138 MMOL/L (ref 136–145)
WBC NRBC COR # BLD AUTO: 6.5 10*3/MM3 (ref 3.4–10.8)

## 2024-09-03 PROCEDURE — 80048 BASIC METABOLIC PNL TOTAL CA: CPT | Performed by: STUDENT IN AN ORGANIZED HEALTH CARE EDUCATION/TRAINING PROGRAM

## 2024-09-03 PROCEDURE — 93246 EXT ECG>7D<15D RECORDING: CPT

## 2024-09-03 PROCEDURE — 82948 REAGENT STRIP/BLOOD GLUCOSE: CPT

## 2024-09-03 PROCEDURE — G0378 HOSPITAL OBSERVATION PER HR: HCPCS

## 2024-09-03 PROCEDURE — 85025 COMPLETE CBC W/AUTO DIFF WBC: CPT | Performed by: STUDENT IN AN ORGANIZED HEALTH CARE EDUCATION/TRAINING PROGRAM

## 2024-09-03 PROCEDURE — 25010000002 CEFTRIAXONE PER 250 MG: Performed by: STUDENT IN AN ORGANIZED HEALTH CARE EDUCATION/TRAINING PROGRAM

## 2024-09-03 PROCEDURE — 99204 OFFICE O/P NEW MOD 45 MIN: CPT

## 2024-09-03 RX ORDER — CEPHALEXIN 500 MG/1
500 CAPSULE ORAL 3 TIMES DAILY
Qty: 15 CAPSULE | Refills: 0 | Status: SHIPPED | OUTPATIENT
Start: 2024-09-04 | End: 2024-09-09

## 2024-09-03 RX ADMIN — FINASTERIDE 5 MG: 5 TABLET, FILM COATED ORAL at 08:22

## 2024-09-03 RX ADMIN — GABAPENTIN 300 MG: 300 CAPSULE ORAL at 08:22

## 2024-09-03 RX ADMIN — BUMETANIDE 2 MG: 2 TABLET ORAL at 08:22

## 2024-09-03 RX ADMIN — Medication 10 ML: at 08:22

## 2024-09-03 RX ADMIN — CEFTRIAXONE SODIUM 1000 MG: 1 INJECTION, POWDER, FOR SOLUTION INTRAMUSCULAR; INTRAVENOUS at 11:32

## 2024-09-03 NOTE — PLAN OF CARE
Goal Outcome Evaluation:  Plan of Care Reviewed With: patient        Progress: no change       No acute changes tonight. VSS. Plan of care ongoing. Call light within reach.

## 2024-09-03 NOTE — PLAN OF CARE
Goal Outcome Evaluation:  Patient being discharged home with all belongings. [facility]. Significant other will provide transportation home. Discharge instructions and updated medication list provided to patient.

## 2024-09-03 NOTE — CONSULTS
Electrophysiology Hospital Consult            Patient Name: Huber Valdovinos  Age/Sex: 78 y.o. male  : 1945  MRN: [id number]    Date of Admission: 2024  Date of Encounter Visit: 24  Encounter Provider: BETI Wyatt  Referring Provider: No ref. provider found  Place of Service: [facility] CARDIOLOGY  Patient Care Team:  Luis Slaughetr MD as PCP - General (Family Medicine)      Subjective:   EP Consultation for: bradycardia     Chief Complaint: weakness     History of Present Illness:  Huber Valdovinos is a 78 y.o. male who [facility]. He has history of CKD, PAF, HFpEF.     [facility]. Told us he does not see a cardiologist.       He presented to ED with generalized weakness on .     HR noted to be in the 40s.     Suspected junctional. He denied any dizziness or syncope but generally felt unwell.     Dr. Eason saw him. His HR resolved and was in the 70-80s.     EP asked to evaluate for bradycardia.         Dr. Delgado and I saw him this morning.     Initially he was upset we were in there and yelled at us to leave.     He then said never mind and told us that he just was not feeling great.     Says initially he was weak which is what brought him in. He says this has happened before ever since he lost weight.     He has lost about 100lbs over the past year. Unintentionally.     He is currently in NSR with rates in the 60-70s.     He told us that he did not feel any better than when he came in.     Past Medical History:  Past Medical History:   Diagnosis Date    Anal fissure     Anemia     BPH (benign prostatic hyperplasia)     Chronic pain     CKD (chronic kidney disease)     Cryoglobulinemia     Degeneration of cervical intervertebral disc     Diverticulitis     Episodic cluster headache     GERD (gastroesophageal reflux disease)     Hemorrhage of gastrointestinal tract     Hypothyroidism     Legal blindness      Macular degeneration     Non-healing surgical wound     Obesity     Osteoarthritis     Panic attacks     Paroxysmal atrial fibrillation     Personal history of alcoholism     Post-thoracotomy pain     Seborrhea     Tobacco user     Urethral stricture     Ventral hernia        Past Surgical History:   Procedure Laterality Date    ABDOMINAL SURGERY      CARDIAC CATHETERIZATION      COLON SURGERY      COLONOSCOPY      GASTRECTOMY      HERNIA REPAIR      JOINT REPLACEMENT         Home Medications:   Medications Prior to Admission   Medication Sig Dispense Refill Last Dose    bumetanide (BUMEX) 2 MG tablet Take 1 tablet by mouth Daily.   8/31/2024    finasteride (PROSCAR) 5 MG tablet Take 1 tablet by mouth Daily.   8/31/2024    gabapentin (NEURONTIN) 300 MG capsule Take 1 capsule by mouth 3 (Three) Times a Day.   8/31/2024    rivaroxaban (XARELTO) 15 MG tablet Take 1 tablet by mouth Daily With Dinner.   8/31/2024    Scopolamine 1 MG/3DAYS patch Place 1 patch on the skin as directed by provider Every 72 (Seventy-Two) Hours.   8/31/2024    tiZANidine (ZANAFLEX) 4 MG tablet Take 1 tablet by mouth At Night As Needed for Muscle Spasms.   8/31/2024    traMADol (ULTRAM) 50 MG tablet Take 1 tablet by mouth Every 8 (Eight) Hours As Needed for Moderate Pain.   8/31/2024       Allergies:  Allergies   Allergen Reactions    Oxycodone Itching       Past Social History:  Social History     Socioeconomic History    Marital status: Single   Tobacco Use    Smoking status: Every Day     Current packs/day: 0.50     Types: Cigarettes    Smokeless tobacco: Never   Vaping Use    Vaping status: Never Used   Substance and Sexual Activity    Alcohol use: Never    Drug use: Never       Past Family History:  History reviewed. No pertinent family history.    Review of Systems: All systems reviewed. Pertinent positives identified in HPI. All other systems are negative.     14 point ROS was performed and is negative except as outlined in HPI.      Objective:     Objective:  Vital Signs (last 24 hours)         09/02 0700 09/03 0659 09/03 0700 09/03 0832   Most Recent      Temp (°F) 98.1 -  98.6      98.3     98.3 (36.8) 09/03 0713    Heart Rate 76 -  79      77     77 09/03 0713    Resp   18      18     18 09/03 0713    /80 -  147/62      131/66     131/66 09/03 0713    SpO2 (%) 93 -  96      95     95 09/03 0713          Temp:  [98.3 °F (36.8 °C)-98.6 °F (37 °C)] 98.3 °F (36.8 °C)  Heart Rate:  [77-79] 77  Resp:  [18] 18  BP: (131-147)/(62-80) 131/66  Body mass index is 28.86 kg/m².        Physical Exam:     General Appearance: No acute distress, well developed and well nourished.   Eyes: Conjunctiva and lids: No erythema, swelling, or discharge. Sclera non-icteric.   HENT: Atraumatic, normocephalic. External eyes, ears, and nose normal.   Respiratory: No signs of respiratory distress. Respiration rhythm and depth normal.   Clear to auscultation. No rales, crackles, rhonchi, or wheezing auscultated.   Cardiovascular:  Jugular Venous Pressure: Normal  Heart Rate and Rhythm: Normal, Heart Sounds: Normal S1 and S2. No S3 or S4 noted.  Murmurs: No murmurs noted. No rubs, thrills, or gallops.   Arterial Pulses: Posterior tibialis and dorsalis pedis pulses normal.   Lower Extremities: No edema noted.  Gastrointestinal:  Abdomen soft, non-distended, non-tender.  Musculoskeletal: Normal movement of extremities  Skin: Warm and dry.   Psychiatric: Patient alert and oriented to person, place, and time. Speech and behavior appropriate. Normal mood and affect.    Labs:   Lab Review:     Results from last 7 days   Lab Units 09/03/24  0446 09/02/24  0811 09/01/24  1623 09/01/24  0710   SODIUM mmol/L 138 139 138 137   POTASSIUM mmol/L 3.4* 4.1 4.0 4.3   CHLORIDE mmol/L 98 95* 96* 97*   CO2 mmol/L 29.0 29.4* 30.0* 30.0*   BUN mg/dL 23 28* 31* 27*   CREATININE mg/dL 2.03* 2.31* 2.56* 2.16*   GLUCOSE mg/dL 79 80 119* 161*   CALCIUM mg/dL 9.5 9.7 9.3 9.4   AST (SGOT)  U/L  --   --   --  19   ALT (SGPT) U/L  --   --   --  8     Results from last 7 days   Lab Units 09/01/24  1012 09/01/24  0710   HSTROP T ng/L 110* 128*     Results from last 7 days   Lab Units 09/03/24  0446   WBC 10*3/mm3 6.50   HEMOGLOBIN g/dL 8.8*   HEMATOCRIT % 28.0*   PLATELETS 10*3/mm3 332     Results from last 7 days   Lab Units 09/01/24  0710   INR  2.32*   APTT seconds 37.7*         Results from last 7 days   Lab Units 09/02/24  0812   MAGNESIUM mg/dL 2.5*         Results from last 7 days   Lab Units 09/01/24  0710   PROBNP pg/mL 2,703.0*         Results from last 7 days   Lab Units 09/01/24  1012   TSH uIU/mL 7.250*     EKG:       I personally viewed and interpreted the patient's EKG/Telemetry tracings.    Assessment:       Generalized weakness    CHF (congestive heart failure)    CKD (chronic kidney disease)    Type 2 diabetes mellitus    GERD (gastroesophageal reflux disease)    Paroxysmal atrial fibrillation    Colostomy in place    Macular degeneration        Plan:   Dr. Delgado and I saw this patient.        He came in with weakness and bradycardia. His bradycardia has resolved but he still feels poorly.     We briefly mentioned pacemaker but does not sound like this aligns with his wishes. Either way it is not clear that he had symptoms related to the bradycardia.     We recommend sending him home with a zio at discharge and can follow up with Dr. Delgado.     Thank you for allowing me to participate in the care of Huber Valdovinos. Feel free to contact me directly with any further questions or concerns.    BETI Wyatt  [facility]  09/03/24  08:32 EDT

## 2024-09-03 NOTE — DISCHARGE SUMMARY
Date of Admission: 9/1/2024  Date of Discharge:  9/3/2024  Primary Care Physician: Luis Slaughter MD     Discharge Diagnosis:  Active Hospital Problems    Diagnosis  POA    **Acute UTI (urinary tract infection) [N39.0]  Unknown    Bradycardia [R00.1]  Unknown    Generalized weakness [R53.1]  Yes    CHF (congestive heart failure) [I50.9]  Yes    CKD (chronic kidney disease) [N18.9]  Yes    Type 2 diabetes mellitus [E11.9]  Yes    GERD (gastroesophageal reflux disease) [K21.9]  Yes    Paroxysmal atrial fibrillation [I48.0]  Yes    Colostomy in place [Z93.3]  Not Applicable    Macular degeneration [H35.30]  Yes      Resolved Hospital Problems   No resolved problems to display.       DETAILS OF HOSPITAL STAY     Pertinent Test Results and Procedures Performed    Chest x-ray:  1. Low lung volumes with ill-defined right greater than left predominant   perihilar and basilar opacities in both lungs, which could represent   small bilateral pleural effusions with underlying atelectasis and/or   pneumonia and possible superimposed mild pulmonary edema.   2. Cardiomegaly, likely accentuated by technique.       Head CT:  1. No acute intracranial hemorrhage or mass/mass effect.   2. Patchy periventricular and subcortical white matter low-attenuation,   statistically representing age-indeterminate small vessel ischemic   changes.   3. Likely benign focal calcification in the brent. Could consider further   evaluation with MRI if there is persistent clinical concern.     Hospital Course  Mr. 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 He presented to the emergency room with generalized weakness, bradycardia, and dysuria.  Please see H&P for full details.  He was found to have a urinary tract infection consistent of pansensitive E. coli.  He initiated on Rocephin and will be transition to Keflex to complete a 1 week course of treatment.  He was evaluated by cardiology for the bradycardia.  This resolved on its own.  He was evaluated by electrophysiology who did not feel that he needed a pacemaker and have sent him out with a Zio patch.  He will follow-up in their office in 3 to 4 weeks once the ZIO has been completed.  He will also follow[facility] with [facility].  He is medically stable and will be discharged back home today.     Physical Exam at Discharge:  General: No acute distress, AAOx3  HEENT: EOMI, PERRL  Cardiovascular: +s1 and s2, RRR  Lungs: No rhonchi or wheezing  Abdomen: soft, nontender    Consults:   Consults       Date and Time Order Name Status Description    9/1/2024 11:57 AM Inpatient Cardiology Consult Completed     9/1/2024  8:58 AM [facility] (on-call MD unless specified) Details                Condition on Discharge: Stable    Discharge Disposition  Home or Self Care    Discharge Medications     Discharge Medications        New Medications        Instructions Start Date   cephalexin 500 MG capsule  Commonly known as: Keflex   500 mg, Oral, 3 Times Daily   Start Date: September 4, 2024            Continue These Medications        Instructions Start Date   bumetanide 2 MG tablet  Commonly known as: BUMEX   2 mg, Oral, Daily      finasteride 5 MG tablet  Commonly known as: PROSCAR   5 mg, Oral, Daily      gabapentin 300 MG capsule  Commonly known as: NEURONTIN   300 mg, Oral, 3 Times Daily      rivaroxaban 15 MG tablet  Commonly known as: XARELTO   15 mg, Oral, Daily With Dinner      Scopolamine 1 MG/3DAYS patch   1 patch, Transdermal, Every 72 Hours      tiZANidine 4 MG  tablet  Commonly known as: ZANAFLEX   4 mg, Oral, Nightly PRN      traMADol 50 MG tablet  Commonly known as: ULTRAM   50 mg, Oral, Every 8 Hours PRN               Discharge Diet:   Diet Instructions       Diet: Regular/House Diet, Cardiac Diets; Low Sodium (2g); Regular (IDDSI 7); Thin (IDDSI 0)      Discharge Diet:  Regular/House Diet  Cardiac Diets       Cardiac Diet: Low Sodium (2g)    Texture: Regular (IDDSI 7)    Fluid Consistency: Thin (IDDSI 0)            Activity at Discharge:   Activity Instructions       Activity as Tolerated              Follow-up Appointments  No future appointments.  Additional Instructions for the Follow-ups that You Need to Schedule       Discharge Follow-up with PCP   As directed       Currently Documented PCP:    Luis Slaughter MD    PCP Phone Number:    [phone]     Follow Up Details: 1 week        Discharge Follow-[facility] with [facility]ty: [facility] in 3-4 weeks   As directed      [facility]ty: [facility] in 3-4 weeks        Discharge Follow-up with Specified Provider: Dr. Delgado of [facility] in 3-4 weeks   As directed      To: Dr. Delgado of [facility] in 3-4 weeks                I have examined and discussed discharge planning with the patient today.    Quincy Morales MD  09/03/24  12:02 EDT    Time: Discharge greater than 30 min

## 2024-09-03 NOTE — CASE MANAGEMENT/SOCIAL WORK
Case Management Discharge Note      Final Note: Home. Orders reviewed no further d/c needs noted         Selected Continued Care - Discharged on 9/3/2024 Admission date: 9/1/2024 - Discharge disposition: Home or Self Care      Destination    No services have been selected for the patient.                Durable Medical Equipment    No services have been selected for the patient.                Dialysis/Infusion    No services have been selected for the patient.                Home Medical Care    No services have been selected for the patient.                Therapy    No services have been selected for the patient.                Community Resources    No services have been selected for the patient.                Community & DME    No services have been selected for the patient.                         Final Discharge Disposition Code: 01 - home or self-care

## 2024-09-04 NOTE — OUTREACH NOTE
Prep Survey      Flowsheet Row Responses   [facility] facility patient discharged from? [place]   Is LACE score < 7 ? No   Eligibility Readm Mgmt   Discharge diagnosis Acute UTI (urinary tract infection)   Does the patient have one of the following disease processes/diagnoses(primary or secondary)? Other   Does the patient have Home health ordered? No   Is there a DME ordered? No   Prep survey completed? Yes            LISHA FLAHERTY - Registered Nurse

## 2024-09-06 ENCOUNTER — READMISSION MANAGEMENT (OUTPATIENT)
Dept: CALL CENTER | Facility: HOSPITAL | Age: 79
End: 2024-09-06
Payer: OTHER GOVERNMENT

## 2024-09-06 NOTE — OUTREACH NOTE
Medical Week 1 Survey      Flowsheet Row Responses   [facility] patient discharged from? [place]   Does the patient have one of the following disease processes/diagnoses(primary or secondary)? Other   Week 1 attempt successful? Yes   Call start time 1544   Call end time 1546   Discharge diagnosis Acute UTI (urinary tract infection)   Person spoke with today (if not patient) and relationship Natalie   Redyd reviewed with patient/caregiver? Yes   Is the patient having any side effects they believe may be caused by any medication additions or changes? No   Does the patient have all medications ordered at discharge? Yes   Is the patient taking all medications as directed (includes completed medication regime)? Yes   Does the patient have a primary care provider?  Yes   Does the patient have an appointment with their PCP within 7 days of discharge? Yes   Has the patient kept scheduled appointments due by today? N/A   Has home health visited the patient within 72 hours of discharge? N/A   Psychosocial issues? No   Did the patient receive a copy of their discharge instructions? Yes   Nursing interventions Reviewed instructions with patient   What is the patient's perception of their health status since discharge? Improving   Is the patient/caregiver able to teach back signs and symptoms related to disease process for when to call PCP? Yes   Is the patient/caregiver able to teach back signs and symptoms related to disease process for when to call 911? Yes   Is the patient/caregiver able to teach back the hierarchy of who to call/visit for symptoms/problems? PCP, Specialist, Home health nurse, Urgent Care, ED, 911 Yes   Week 1 call completed? Yes   Graduated Yes   Graduated/Revoked comments Pt is improving and [facility]. Pt is still weak and tired.   Call end time 1546            BRYAN BAIG - Registered Nurse

## 2024-10-01 ENCOUNTER — APPOINTMENT (OUTPATIENT)
Dept: GENERAL RADIOLOGY | Facility: HOSPITAL | Age: 79
End: 2024-10-01
Payer: OTHER GOVERNMENT

## 2024-10-01 ENCOUNTER — HOSPITAL ENCOUNTER (OUTPATIENT)
Facility: HOSPITAL | Age: 79
Setting detail: OBSERVATION
Discharge: HOME | End: 2024-10-02
Attending: EMERGENCY MEDICINE | Admitting: INTERNAL MEDICINE
Payer: OTHER GOVERNMENT

## 2024-10-01 ENCOUNTER — TELEPHONE (OUTPATIENT)
Dept: CARDIOLOGY | Facility: CLINIC | Age: 79
End: 2024-10-01
Payer: OTHER GOVERNMENT

## 2024-10-01 DIAGNOSIS — R06.00 ACUTE DYSPNEA: ICD-10-CM

## 2024-10-01 DIAGNOSIS — R55 SYNCOPE AND COLLAPSE: Primary | ICD-10-CM

## 2024-10-01 DIAGNOSIS — D64.9 CHRONIC ANEMIA: ICD-10-CM

## 2024-10-01 DIAGNOSIS — N18.9 CHRONIC KIDNEY DISEASE, UNSPECIFIED CKD STAGE: ICD-10-CM

## 2024-10-01 DIAGNOSIS — F41.9 ANXIETY: ICD-10-CM

## 2024-10-01 DIAGNOSIS — J44.1 ACUTE EXACERBATION OF CHRONIC OBSTRUCTIVE PULMONARY DISEASE (COPD): ICD-10-CM

## 2024-10-01 DIAGNOSIS — I50.9 CHRONIC CONGESTIVE HEART FAILURE, UNSPECIFIED HEART FAILURE TYPE: ICD-10-CM

## 2024-10-01 DIAGNOSIS — R79.89 ELEVATED TROPONIN: ICD-10-CM

## 2024-10-01 LAB
ALBUMIN SERPL-MCNC: 4.3 G/DL (ref 3.5–5.2)
ALBUMIN/GLOB SERPL: 1.2 G/DL
ALP SERPL-CCNC: 132 U/L (ref 39–117)
ALT SERPL W P-5'-P-CCNC: 14 U/L (ref 1–41)
ANION GAP SERPL CALCULATED.3IONS-SCNC: 19 MMOL/L (ref 5–15)
AST SERPL-CCNC: 31 U/L (ref 1–40)
BASOPHILS # BLD AUTO: 0.04 10*3/MM3 (ref 0–0.2)
BASOPHILS NFR BLD AUTO: 0.5 % (ref 0–1.5)
BILIRUB SERPL-MCNC: 0.6 MG/DL (ref 0–1.2)
BILIRUB UR QL STRIP: NEGATIVE
BUN SERPL-MCNC: 37 MG/DL (ref 8–23)
BUN/CREAT SERPL: 14.4 (ref 7–25)
CALCIUM SPEC-SCNC: 10 MG/DL (ref 8.6–10.5)
CHLORIDE SERPL-SCNC: 98 MMOL/L (ref 98–107)
CLARITY UR: CLEAR
CO2 SERPL-SCNC: 25 MMOL/L (ref 22–29)
COLOR UR: YELLOW
CREAT SERPL-MCNC: 2.57 MG/DL (ref 0.76–1.27)
DEPRECATED RDW RBC AUTO: 67.8 FL (ref 37–54)
EGFRCR SERPLBLD CKD-EPI 2021: 25 ML/MIN/1.73
EOSINOPHIL # BLD AUTO: 0.02 10*3/MM3 (ref 0–0.4)
EOSINOPHIL NFR BLD AUTO: 0.3 % (ref 0.3–6.2)
ERYTHROCYTE [DISTWIDTH] IN BLOOD BY AUTOMATED COUNT: 23 % (ref 12.3–15.4)
GEN 5 2HR TROPONIN T REFLEX: 122 NG/L
GLOBULIN UR ELPH-MCNC: 3.7 GM/DL
GLUCOSE SERPL-MCNC: 106 MG/DL (ref 65–99)
GLUCOSE UR STRIP-MCNC: NEGATIVE MG/DL
HCT VFR BLD AUTO: 29.3 % (ref 37.5–51)
HGB BLD-MCNC: 9.8 G/DL (ref 13–17.7)
HGB UR QL STRIP.AUTO: NEGATIVE
IMM GRANULOCYTES # BLD AUTO: 0.06 10*3/MM3 (ref 0–0.05)
IMM GRANULOCYTES NFR BLD AUTO: 0.8 % (ref 0–0.5)
KETONES UR QL STRIP: NEGATIVE
LEUKOCYTE ESTERASE UR QL STRIP.AUTO: NEGATIVE
LYMPHOCYTES # BLD AUTO: 0.97 10*3/MM3 (ref 0.7–3.1)
LYMPHOCYTES NFR BLD AUTO: 12.9 % (ref 19.6–45.3)
MCH RBC QN AUTO: 28.5 PG (ref 26.6–33)
MCHC RBC AUTO-ENTMCNC: 33.4 G/DL (ref 31.5–35.7)
MCV RBC AUTO: 85.2 FL (ref 79–97)
MONOCYTES # BLD AUTO: 0.64 10*3/MM3 (ref 0.1–0.9)
MONOCYTES NFR BLD AUTO: 8.5 % (ref 5–12)
NEUTROPHILS NFR BLD AUTO: 5.79 10*3/MM3 (ref 1.7–7)
NEUTROPHILS NFR BLD AUTO: 77 % (ref 42.7–76)
NITRITE UR QL STRIP: NEGATIVE
NRBC BLD AUTO-RTO: 0 /100 WBC (ref 0–0.2)
NT-PROBNP SERPL-MCNC: 2365 PG/ML (ref 0–1800)
PH UR STRIP.AUTO: 8.5 [PH] (ref 5–8)
PLATELET # BLD AUTO: 369 10*3/MM3 (ref 140–450)
PMV BLD AUTO: 9.2 FL (ref 6–12)
POTASSIUM SERPL-SCNC: 3.8 MMOL/L (ref 3.5–5.2)
PROT SERPL-MCNC: 8 G/DL (ref 6–8.5)
PROT UR QL STRIP: NEGATIVE
RBC # BLD AUTO: 3.44 10*6/MM3 (ref 4.14–5.8)
SODIUM SERPL-SCNC: 142 MMOL/L (ref 136–145)
SP GR UR STRIP: 1.01 (ref 1–1.03)
TROPONIN T DELTA: 8 NG/L
TROPONIN T SERPL HS-MCNC: 114 NG/L
UROBILINOGEN UR QL STRIP: ABNORMAL
WBC NRBC COR # BLD AUTO: 7.52 10*3/MM3 (ref 3.4–10.8)

## 2024-10-01 PROCEDURE — 94799 UNLISTED PULMONARY SVC/PX: CPT

## 2024-10-01 PROCEDURE — 84484 ASSAY OF TROPONIN QUANT: CPT | Performed by: EMERGENCY MEDICINE

## 2024-10-01 PROCEDURE — 80053 COMPREHEN METABOLIC PANEL: CPT | Performed by: EMERGENCY MEDICINE

## 2024-10-01 PROCEDURE — 83880 ASSAY OF NATRIURETIC PEPTIDE: CPT | Performed by: EMERGENCY MEDICINE

## 2024-10-01 PROCEDURE — G0378 HOSPITAL OBSERVATION PER HR: HCPCS

## 2024-10-01 PROCEDURE — 93010 ELECTROCARDIOGRAM REPORT: CPT | Performed by: INTERNAL MEDICINE

## 2024-10-01 PROCEDURE — 93005 ELECTROCARDIOGRAM TRACING: CPT | Performed by: EMERGENCY MEDICINE

## 2024-10-01 PROCEDURE — 94664 DEMO&/EVAL PT USE INHALER: CPT

## 2024-10-01 PROCEDURE — 36415 COLL VENOUS BLD VENIPUNCTURE: CPT

## 2024-10-01 PROCEDURE — 99285 EMERGENCY DEPT VISIT HI MDM: CPT

## 2024-10-01 PROCEDURE — 81003 URINALYSIS AUTO W/O SCOPE: CPT | Performed by: EMERGENCY MEDICINE

## 2024-10-01 PROCEDURE — 85025 COMPLETE CBC W/AUTO DIFF WBC: CPT | Performed by: EMERGENCY MEDICINE

## 2024-10-01 PROCEDURE — 71045 X-RAY EXAM CHEST 1 VIEW: CPT

## 2024-10-01 PROCEDURE — 94640 AIRWAY INHALATION TREATMENT: CPT

## 2024-10-01 RX ORDER — SODIUM CHLORIDE 0.9 % (FLUSH) 0.9 %
10 SYRINGE (ML) INJECTION AS NEEDED
Status: DISCONTINUED | OUTPATIENT
Start: 2024-10-01 | End: 2024-10-02 | Stop reason: HOSPADM

## 2024-10-01 RX ORDER — INSULIN LISPRO 100 [IU]/ML
2-7 INJECTION, SOLUTION INTRAVENOUS; SUBCUTANEOUS
Status: DISCONTINUED | OUTPATIENT
Start: 2024-10-02 | End: 2024-10-02 | Stop reason: HOSPADM

## 2024-10-01 RX ORDER — ACETAMINOPHEN 160 MG/5ML
650 SOLUTION ORAL EVERY 4 HOURS PRN
Status: DISCONTINUED | OUTPATIENT
Start: 2024-10-01 | End: 2024-10-02 | Stop reason: HOSPADM

## 2024-10-01 RX ORDER — ALBUTEROL SULFATE 0.83 MG/ML
2.5 SOLUTION RESPIRATORY (INHALATION) ONCE
Status: COMPLETED | OUTPATIENT
Start: 2024-10-01 | End: 2024-10-01

## 2024-10-01 RX ORDER — BISACODYL 10 MG
10 SUPPOSITORY, RECTAL RECTAL DAILY PRN
Status: DISCONTINUED | OUTPATIENT
Start: 2024-10-01 | End: 2024-10-02 | Stop reason: HOSPADM

## 2024-10-01 RX ORDER — NITROGLYCERIN 0.4 MG/1
0.4 TABLET SUBLINGUAL
Status: DISCONTINUED | OUTPATIENT
Start: 2024-10-01 | End: 2024-10-02 | Stop reason: HOSPADM

## 2024-10-01 RX ORDER — SODIUM CHLORIDE 0.9 % (FLUSH) 0.9 %
10 SYRINGE (ML) INJECTION EVERY 12 HOURS SCHEDULED
Status: DISCONTINUED | OUTPATIENT
Start: 2024-10-01 | End: 2024-10-02 | Stop reason: HOSPADM

## 2024-10-01 RX ORDER — BISACODYL 5 MG/1
5 TABLET, DELAYED RELEASE ORAL DAILY PRN
Status: DISCONTINUED | OUTPATIENT
Start: 2024-10-01 | End: 2024-10-02 | Stop reason: HOSPADM

## 2024-10-01 RX ORDER — POLYETHYLENE GLYCOL 3350 17 G/17G
17 POWDER, FOR SOLUTION ORAL DAILY PRN
Status: DISCONTINUED | OUTPATIENT
Start: 2024-10-01 | End: 2024-10-02 | Stop reason: HOSPADM

## 2024-10-01 RX ORDER — AMOXICILLIN 250 MG
2 CAPSULE ORAL 2 TIMES DAILY PRN
Status: DISCONTINUED | OUTPATIENT
Start: 2024-10-01 | End: 2024-10-02 | Stop reason: HOSPADM

## 2024-10-01 RX ORDER — DEXTROSE MONOHYDRATE 25 G/50ML
25 INJECTION, SOLUTION INTRAVENOUS
Status: DISCONTINUED | OUTPATIENT
Start: 2024-10-01 | End: 2024-10-02 | Stop reason: HOSPADM

## 2024-10-01 RX ORDER — FAMOTIDINE 20 MG/1
20 TABLET, FILM COATED ORAL 2 TIMES DAILY PRN
Status: DISCONTINUED | OUTPATIENT
Start: 2024-10-01 | End: 2024-10-02 | Stop reason: HOSPADM

## 2024-10-01 RX ORDER — ACETAMINOPHEN 650 MG/1
650 SUPPOSITORY RECTAL EVERY 4 HOURS PRN
Status: DISCONTINUED | OUTPATIENT
Start: 2024-10-01 | End: 2024-10-02 | Stop reason: HOSPADM

## 2024-10-01 RX ORDER — NICOTINE POLACRILEX 4 MG
15 LOZENGE BUCCAL
Status: DISCONTINUED | OUTPATIENT
Start: 2024-10-01 | End: 2024-10-02 | Stop reason: HOSPADM

## 2024-10-01 RX ORDER — SODIUM CHLORIDE 9 MG/ML
40 INJECTION, SOLUTION INTRAVENOUS AS NEEDED
Status: DISCONTINUED | OUTPATIENT
Start: 2024-10-01 | End: 2024-10-02 | Stop reason: HOSPADM

## 2024-10-01 RX ORDER — ACETAMINOPHEN 325 MG/1
650 TABLET ORAL EVERY 4 HOURS PRN
Status: DISCONTINUED | OUTPATIENT
Start: 2024-10-01 | End: 2024-10-02 | Stop reason: HOSPADM

## 2024-10-01 RX ORDER — IBUPROFEN 600 MG/1
1 TABLET ORAL
Status: DISCONTINUED | OUTPATIENT
Start: 2024-10-01 | End: 2024-10-02 | Stop reason: HOSPADM

## 2024-10-01 RX ORDER — ONDANSETRON 2 MG/ML
4 INJECTION INTRAMUSCULAR; INTRAVENOUS EVERY 6 HOURS PRN
Status: DISCONTINUED | OUTPATIENT
Start: 2024-10-01 | End: 2024-10-02 | Stop reason: HOSPADM

## 2024-10-01 RX ADMIN — ALBUTEROL SULFATE 2.5 MG: 2.5 SOLUTION RESPIRATORY (INHALATION) at 20:15

## 2024-10-01 NOTE — TELEPHONE ENCOUNTER
Caller: Huber Valdovinos    Relationship: Self    Best call back number: [phone]      PATIENT WAS IN HOSPITAL 9.3.24 AND PER DISCHARGE NOTES NEEDS FOLLOW UP WITH [employer]    PATIENT ALSO NEEDS FOLLOW UP WITH CARDIOLOGY, DOES NOT [facility]OUGH [facility], WAS SEEN BY DR HAY IN OFFICE    PATIENT IS ALSO LOOKING FOR THE RESULTS OF THE HEART MONITOR.

## 2024-10-01 NOTE — ED PROVIDER NOTES
EMERGENCY DEPARTMENT ENCOUNTER  Room Number:  24/24  PCP: Luis Slaughter MD  Independent Historians: Patient and EMS      HPI:  Chief Complaint: had concerns including Shortness of Breath.     A complete HPI/ROS/PMH/PSH/SH/FH are unobtainable due to: Poor historian    Chronic or social conditions impacting patient care (Social Determinants of Health): None      Context: Huber Valdovinos is a 79 y.o. male with a medical history of A-fib, blindness, hypothyroidism who presents to the ED c/o acute shortness of breath.  The patient reports that he has felt short of breath for the last week.  He reports no chest pain.  He has a history of tobacco abuse.  He is not able to provide much history.      Review of prior external notes (non-ED) -and- Review of prior external test results outside of this encounter:  Discharge summary dated 9/3/2024 with bradycardia, generalized weakness and a acute UTI.  He had been admitted for generalized weakness, bradycardia and dysuria.  He had a UTI with pansensitive E. coli.    Prescription drug monitoring program review:         PAST MEDICAL HISTORY  Active Ambulatory Problems     Diagnosis Date Noted    Generalized weakness 09/01/2024    CHF (congestive heart failure) 09/01/2024    CKD (chronic kidney disease) 09/01/2024    Type 2 diabetes mellitus 09/01/2024    GERD (gastroesophageal reflux disease) 09/01/2024    Paroxysmal atrial fibrillation 09/01/2024    Colostomy in place 09/01/2024    Macular degeneration 09/01/2024    Acute UTI (urinary tract infection) 09/03/2024    Bradycardia 09/03/2024     Resolved Ambulatory Problems     Diagnosis Date Noted    No Resolved Ambulatory Problems     Past Medical History:   Diagnosis Date    Anal fissure     Anemia     BPH (benign prostatic hyperplasia)     Chronic pain     Cryoglobulinemia     Degeneration of cervical intervertebral disc     Diverticulitis     Episodic cluster headache     Hemorrhage of gastrointestinal tract      Hypothyroidism     Legal blindness     Non-healing surgical wound     Obesity     Osteoarthritis     Panic attacks     Personal history of alcoholism     Post-thoracotomy pain     Seborrhea     Tobacco user     Urethral stricture     Ventral hernia          PAST SURGICAL HISTORY  Past Surgical History:   Procedure Laterality Date    ABDOMINAL SURGERY      CARDIAC CATHETERIZATION      COLON SURGERY      COLONOSCOPY      GASTRECTOMY      HERNIA REPAIR      JOINT REPLACEMENT           FAMILY HISTORY  History reviewed. No pertinent family history.      SOCIAL HISTORY  Social History     Socioeconomic History    Marital status: Single   Tobacco Use    Smoking status: Every Day     Current packs/day: 0.50     Types: Cigarettes    Smokeless tobacco: Never   Vaping Use    Vaping status: Never Used   Substance and Sexual Activity    Alcohol use: Never    Drug use: Never         ALLERGIES  Oxycodone      REVIEW OF SYSTEMS  Review of Systems  Included in HPI  All systems reviewed and negative except for those discussed in HPI.      PHYSICAL EXAM    I have reviewed the triage vital signs and nursing notes.    ED Triage Vitals   Temp Heart Rate Resp BP SpO2   10/01/24 1916 10/01/24 1916 10/01/24 1916 10/01/24 1916 10/01/24 1919   98.4 °F (36.9 °C) 84 18 160/80 95 %      Temp src Heart Rate Source Patient Position BP Location FiO2 (%)   -- -- 10/01/24 1916 10/01/24 1916 --     Sitting Left arm        Physical Exam  GENERAL: Awake, alert, no acute distress, speaking in full sentences without distress  SKIN: Warm, dry  HENT: Normocephalic, atraumatic  EYES: no scleral icterus  CV: regular rhythm, regular rate  RESPIRATORY: normal effort, lungs clear  ABDOMEN: soft, nontender, nondistended  MUSCULOSKELETAL: no deformity, no significant lower extremity edema  NEURO: alert, moves all extremities, follows commands            LAB RESULTS  Recent Results (from the past 24 hour(s))   Comprehensive Metabolic Panel    Collection Time:  10/01/24  7:39 PM    Specimen: Blood   Result Value Ref Range    Glucose 106 (H) 65 - 99 mg/dL    BUN 37 (H) 8 - 23 mg/dL    Creatinine 2.57 (H) 0.76 - 1.27 mg/dL    Sodium 142 136 - 145 mmol/L    Potassium 3.8 3.5 - 5.2 mmol/L    Chloride 98 98 - 107 mmol/L    CO2 25.0 22.0 - 29.0 mmol/L    Calcium 10.0 8.6 - 10.5 mg/dL    Total Protein 8.0 6.0 - 8.5 g/dL    Albumin 4.3 3.5 - 5.2 g/dL    ALT (SGPT) 14 1 - 41 U/L    AST (SGOT) 31 1 - 40 U/L    Alkaline Phosphatase 132 (H) 39 - 117 U/L    Total Bilirubin 0.6 0.0 - 1.2 mg/dL    Globulin 3.7 gm/dL    A/G Ratio 1.2 g/dL    BUN/Creatinine Ratio 14.4 7.0 - 25.0    Anion Gap 19.0 (H) 5.0 - 15.0 mmol/L    eGFR 25.0 (L) >60.0 mL/min/1.73   BNP    Collection Time: 10/01/24  7:39 PM    Specimen: Blood   Result Value Ref Range    proBNP 2,365.0 (H) 0.0 - 1,800.0 pg/mL   High Sensitivity Troponin T    Collection Time: 10/01/24  7:39 PM    Specimen: Blood   Result Value Ref Range    HS Troponin T 114 (C) <22 ng/L   CBC Auto Differential    Collection Time: 10/01/24  7:39 PM    Specimen: Blood   Result Value Ref Range    WBC 7.52 3.40 - 10.80 10*3/mm3    RBC 3.44 (L) 4.14 - 5.80 10*6/mm3    Hemoglobin 9.8 (L) 13.0 - 17.7 g/dL    Hematocrit 29.3 (L) 37.5 - 51.0 %    MCV 85.2 79.0 - 97.0 fL    MCH 28.5 26.6 - 33.0 pg    MCHC 33.4 31.5 - 35.7 g/dL    RDW 23.0 (H) 12.3 - 15.4 %    RDW-SD 67.8 (H) 37.0 - 54.0 fl    MPV 9.2 6.0 - 12.0 fL    Platelets 369 140 - 450 10*3/mm3    Neutrophil % 77.0 (H) 42.7 - 76.0 %    Lymphocyte % 12.9 (L) 19.6 - 45.3 %    Monocyte % 8.5 5.0 - 12.0 %    Eosinophil % 0.3 0.3 - 6.2 %    Basophil % 0.5 0.0 - 1.5 %    Immature Grans % 0.8 (H) 0.0 - 0.5 %    Neutrophils, Absolute 5.79 1.70 - 7.00 10*3/mm3    Lymphocytes, Absolute 0.97 0.70 - 3.10 10*3/mm3    Monocytes, Absolute 0.64 0.10 - 0.90 10*3/mm3    Eosinophils, Absolute 0.02 0.00 - 0.40 10*3/mm3    Basophils, Absolute 0.04 0.00 - 0.20 10*3/mm3    Immature Grans, Absolute 0.06 (H) 0.00 - 0.05 10*3/mm3     nRBC 0.0 0.0 - 0.2 /100 WBC   ECG 12 Lead Dyspnea    Collection Time: 10/01/24  7:44 PM   Result Value Ref Range    QT Interval 399 ms    QTC Interval 483 ms   High Sensitivity Troponin T 2Hr    Collection Time: 10/01/24  9:49 PM    Specimen: Blood   Result Value Ref Range    HS Troponin T 122 (C) <22 ng/L    Troponin T Delta 8 (C) >=-4 - <+4 ng/L         RADIOLOGY  XR Chest 1 View    Result Date: 10/1/2024  XR CHEST 1 VW-  HISTORY: Male who is 79 years-old, short of breath  TECHNIQUE: Frontal view of the chest  COMPARISON: 9/1/2024  FINDINGS: The heart is enlarged. Pulmonary vasculature is unremarkable. Old granulomatous disease is present. Mild right pleural effusion is apparent, with small likely atelectasis infiltrate at the lung bases, follow-up suggested. No pneumothorax. No acute osseous process.      As described.  This report was finalized on 10/1/2024 8:52 PM by Dr. Juan Baez M.D on Workstation: [id number]         MEDICATIONS GIVEN IN ER  Medications   albuterol (PROVENTIL) nebulizer solution 0.083% 2.5 mg/3mL (2.5 mg Nebulization Given 10/1/24 2015)         ORDERS PLACED DURING THIS VISIT:  Orders Placed This Encounter   Procedures    XR Chest 1 View    Comprehensive Metabolic Panel    BNP    High Sensitivity Troponin T    CBC Auto Differential    High Sensitivity Troponin T 2Hr    Urinalysis With Microscopic If Indicated (No Culture) - Urine, Clean Catch    [facility] (on-call MD unless specified) Details    ECG 12 Lead Dyspnea    Initiate Observation Status    CBC & Differential         OUTPATIENT MEDICATION MANAGEMENT:  No current Epic-ordered facility-administered medications on file.     Current Outpatient Medications Ordered in Epic   Medication Sig Dispense Refill    bumetanide (BUMEX) 2 MG tablet Take 1 tablet by mouth Daily.      finasteride (PROSCAR) 5 MG tablet Take 1 tablet by mouth Daily.      gabapentin (NEURONTIN) 300 MG capsule Take 1 capsule by mouth 3 (Three) Times a Day.      rivaroxaban  (XARELTO) 15 MG tablet Take 1 tablet by mouth Daily With Dinner.      Scopolamine 1 MG/3DAYS patch Place 1 patch on the skin as directed by provider Every 72 (Seventy-Two) Hours.      tiZANidine (ZANAFLEX) 4 MG tablet Take 1 tablet by mouth At Night As Needed for Muscle Spasms.      traMADol (ULTRAM) 50 MG tablet Take 1 tablet by mouth Every 8 (Eight) Hours As Needed for Moderate Pain.           PROCEDURES  Procedures            PROGRESS, DATA ANALYSIS, CONSULTS, AND MEDICAL DECISION MAKING  All labs have been independently interpreted by me.  All radiology studies have been reviewed by me. All EKG's have been independently viewed and interpreted by me.  Discussion below represents my analysis of pertinent findings related to patient's condition, differential diagnosis, treatment plan and final disposition.    Differential diagnosis includes but is not limited to acute coronary syndrome, acute aortic syndrome, PE, pneumothorax, stable angina, COPD, asthma.    Clinical Scores:                   ED Course as of 10/01/24 2245   Tue Oct 01, 2024   1954 XR Chest 1 View  My independent interpretation of the imaging study is no pneumothorax [TR]   1954 EKG          EKG time: 1944  Rhythm/Rate: Normal sinus, rate 88  P waves and AZ: Normal P, prolonged AZ  QRS, axis: Interventricular conduction delay  ST and T waves: No acute    Independently Interpreted by me  Increased rate changed compared to prior 9/1/2024   [TR]   2146 I reviewed the workup and findings with the patient at the bedside.  Answered all questions.  He has normal oxygenation.  He has normal heart rate.  He has normal blood pressure.  He has chronic unchanged anemia.  He has chronic unchanged renal insufficiency.  His BNP is at the baseline.  He has an elevated troponin which is at his baseline.  We are obtaining a repeat.  The patient is showing no signs of respiratory distress.  He is significantly anxious and I think that his anxiety is causing him the  sudden sensation of shortness of breath.  When he is in the room by himself he has normal respiratory rate and is quite comfortable appearing.  We are pending second troponin and then disposition.  Likely home. [TR]   2222 Troponin T Delta(!!): 8 [TR]   2223 I reviewed the workup and findings with the patient at the bedside.  Answered all questions.  He has a delta troponin of 8.  I cannot rule out acute coronary event causing his shortness of breath.  In the case of discharge, he has high risk of return visit to the emergency room given his high level anxiety and living alone.  Plan admission for cardiac rule out, further management.  Given the level of his anxiety and his behavior he may require facility placement.  I am not entirely sure he is able to take care of himself at home. [TR]   2239 I reviewed the workup and findings with the patient.  Plan admission.  He is agreeable. [TR]   2244 Discussing with Atiya with [facility].  She agrees to admit to Dr. Kumar. [TR]      ED Course User Index  [TR] Nando Calderon MD             AS OF 22:45 EDT VITALS:    BP - 111/51  HR - 69  TEMP - 98.4 °F (36.9 °C)  O2 SATS - 98%    COMPLEXITY OF CARE  The patient requires admission.      DIAGNOSIS  Final diagnoses:   Chronic kidney disease, unspecified CKD stage   Chronic anemia   Acute exacerbation of chronic obstructive pulmonary disease (COPD)   Elevated troponin   Chronic congestive heart failure, unspecified heart failure type   Acute dyspnea   Anxiety         DISPOSITION  ED Disposition       ED Disposition   Decision to Admit    Condition   --    Comment   Level of Care: Telemetry [5]   Diagnosis: Acute dyspnea [[id number]]   Admitting Physician: BETO KUMAR [[id number]]   Attending Physician: BETO KUMAR [[id number]]   Is patient appropriate for Inpatient Observation Unit?: No [0]                  Please note that portions of this document were completed with a voice recognition program.    Note Disclaimer: At  [facility], we believe that sharing information builds trust and better relationships. You are receiving this note because you recently visited [facility]. It is possible you will see health information before a provider has talked with you about it. This kind of information can be easy to misunderstand. To help you fully understand what it means for your health, we urge you to discuss this note with your provider.         Nando Calderon MD  10/01/24 7031

## 2024-10-02 ENCOUNTER — READMISSION MANAGEMENT (OUTPATIENT)
Dept: CALL CENTER | Facility: HOSPITAL | Age: 79
End: 2024-10-02
Payer: OTHER GOVERNMENT

## 2024-10-02 VITALS
TEMPERATURE: 98 F | HEART RATE: 83 BPM | SYSTOLIC BLOOD PRESSURE: 118 MMHG | WEIGHT: 170.5 LBS | DIASTOLIC BLOOD PRESSURE: 71 MMHG | RESPIRATION RATE: 24 BRPM | HEIGHT: 66 IN | BODY MASS INDEX: 27.4 KG/M2 | OXYGEN SATURATION: 100 %

## 2024-10-02 PROBLEM — R55 SYNCOPE AND COLLAPSE: Status: ACTIVE | Noted: 2024-10-01

## 2024-10-02 LAB
ALBUMIN SERPL-MCNC: 3.7 G/DL (ref 3.5–5.2)
ALBUMIN/GLOB SERPL: 1.1 G/DL
ALP SERPL-CCNC: 107 U/L (ref 39–117)
ALT SERPL W P-5'-P-CCNC: 16 U/L (ref 1–41)
ANION GAP SERPL CALCULATED.3IONS-SCNC: 16.4 MMOL/L (ref 5–15)
AST SERPL-CCNC: 29 U/L (ref 1–40)
BILIRUB SERPL-MCNC: 0.8 MG/DL (ref 0–1.2)
BUN SERPL-MCNC: 34 MG/DL (ref 8–23)
BUN/CREAT SERPL: 14.2 (ref 7–25)
CALCIUM SPEC-SCNC: 9.7 MG/DL (ref 8.6–10.5)
CHLORIDE SERPL-SCNC: 98 MMOL/L (ref 98–107)
CO2 SERPL-SCNC: 24.6 MMOL/L (ref 22–29)
CREAT SERPL-MCNC: 2.39 MG/DL (ref 0.76–1.27)
DEPRECATED RDW RBC AUTO: 70.3 FL (ref 37–54)
EGFRCR SERPLBLD CKD-EPI 2021: 27.2 ML/MIN/1.73
ERYTHROCYTE [DISTWIDTH] IN BLOOD BY AUTOMATED COUNT: 23.5 % (ref 12.3–15.4)
GLOBULIN UR ELPH-MCNC: 3.4 GM/DL
GLUCOSE BLDC GLUCOMTR-MCNC: 133 MG/DL (ref 70–130)
GLUCOSE SERPL-MCNC: 108 MG/DL (ref 65–99)
HCT VFR BLD AUTO: 26.6 % (ref 37.5–51)
HGB BLD-MCNC: 8.5 G/DL (ref 13–17.7)
MCH RBC QN AUTO: 27.3 PG (ref 26.6–33)
MCHC RBC AUTO-ENTMCNC: 32 G/DL (ref 31.5–35.7)
MCV RBC AUTO: 85.5 FL (ref 79–97)
PLATELET # BLD AUTO: 332 10*3/MM3 (ref 140–450)
PMV BLD AUTO: 9.5 FL (ref 6–12)
POTASSIUM SERPL-SCNC: 3.6 MMOL/L (ref 3.5–5.2)
PROT SERPL-MCNC: 7.1 G/DL (ref 6–8.5)
QT INTERVAL: 399 MS
QTC INTERVAL: 483 MS
RBC # BLD AUTO: 3.11 10*6/MM3 (ref 4.14–5.8)
SODIUM SERPL-SCNC: 139 MMOL/L (ref 136–145)
TROPONIN T SERPL HS-MCNC: 111 NG/L
WBC NRBC COR # BLD AUTO: 7.63 10*3/MM3 (ref 3.4–10.8)

## 2024-10-02 PROCEDURE — C1764 EVENT RECORDER, CARDIAC: HCPCS | Performed by: STUDENT IN AN ORGANIZED HEALTH CARE EDUCATION/TRAINING PROGRAM

## 2024-10-02 PROCEDURE — 97530 THERAPEUTIC ACTIVITIES: CPT

## 2024-10-02 PROCEDURE — 36415 COLL VENOUS BLD VENIPUNCTURE: CPT | Performed by: NURSE PRACTITIONER

## 2024-10-02 PROCEDURE — 80053 COMPREHEN METABOLIC PANEL: CPT | Performed by: NURSE PRACTITIONER

## 2024-10-02 PROCEDURE — 84484 ASSAY OF TROPONIN QUANT: CPT | Performed by: NURSE PRACTITIONER

## 2024-10-02 PROCEDURE — 97110 THERAPEUTIC EXERCISES: CPT

## 2024-10-02 PROCEDURE — 97165 OT EVAL LOW COMPLEX 30 MIN: CPT

## 2024-10-02 PROCEDURE — 97162 PT EVAL MOD COMPLEX 30 MIN: CPT

## 2024-10-02 PROCEDURE — 85027 COMPLETE CBC AUTOMATED: CPT | Performed by: NURSE PRACTITIONER

## 2024-10-02 PROCEDURE — G0378 HOSPITAL OBSERVATION PER HR: HCPCS

## 2024-10-02 PROCEDURE — 33285 INSJ SUBQ CAR RHYTHM MNTR: CPT | Performed by: STUDENT IN AN ORGANIZED HEALTH CARE EDUCATION/TRAINING PROGRAM

## 2024-10-02 PROCEDURE — 99254 IP/OBS CNSLTJ NEW/EST MOD 60: CPT

## 2024-10-02 PROCEDURE — 82948 REAGENT STRIP/BLOOD GLUCOSE: CPT

## 2024-10-02 PROCEDURE — 25010000002 LIDOCAINE 1 % SOLUTION: Performed by: STUDENT IN AN ORGANIZED HEALTH CARE EDUCATION/TRAINING PROGRAM

## 2024-10-02 DEVICE — SYS ICM LINQ2 W/MOBL/MONTR: Type: IMPLANTABLE DEVICE | Site: CHEST | Status: FUNCTIONAL

## 2024-10-02 RX ORDER — FINASTERIDE 5 MG/1
5 TABLET, FILM COATED ORAL DAILY
Status: DISCONTINUED | OUTPATIENT
Start: 2024-10-02 | End: 2024-10-02 | Stop reason: HOSPADM

## 2024-10-02 RX ORDER — SCOLOPAMINE TRANSDERMAL SYSTEM 1 MG/1
1 PATCH, EXTENDED RELEASE TRANSDERMAL
Status: DISCONTINUED | OUTPATIENT
Start: 2024-10-02 | End: 2024-10-02 | Stop reason: HOSPADM

## 2024-10-02 RX ORDER — GABAPENTIN 300 MG/1
300 CAPSULE ORAL 3 TIMES DAILY
Status: DISCONTINUED | OUTPATIENT
Start: 2024-10-02 | End: 2024-10-02 | Stop reason: HOSPADM

## 2024-10-02 RX ORDER — TRAMADOL HYDROCHLORIDE 50 MG/1
50 TABLET ORAL EVERY 8 HOURS PRN
Status: DISCONTINUED | OUTPATIENT
Start: 2024-10-02 | End: 2024-10-02 | Stop reason: HOSPADM

## 2024-10-02 RX ORDER — BUMETANIDE 2 MG/1
2 TABLET ORAL DAILY
Status: DISCONTINUED | OUTPATIENT
Start: 2024-10-02 | End: 2024-10-02 | Stop reason: HOSPADM

## 2024-10-02 RX ORDER — LIDOCAINE HYDROCHLORIDE 10 MG/ML
INJECTION, SOLUTION INFILTRATION; PERINEURAL
Status: DISCONTINUED | OUTPATIENT
Start: 2024-10-02 | End: 2024-10-02 | Stop reason: HOSPADM

## 2024-10-02 RX ADMIN — FINASTERIDE 5 MG: 5 TABLET, FILM COATED ORAL at 09:20

## 2024-10-02 RX ADMIN — GABAPENTIN 300 MG: 300 CAPSULE ORAL at 09:20

## 2024-10-02 RX ADMIN — Medication 10 ML: at 09:19

## 2024-10-02 RX ADMIN — TIZANIDINE 4 MG: 4 TABLET ORAL at 05:03

## 2024-10-02 RX ADMIN — SCOPALAMINE 1 PATCH: 1 PATCH, EXTENDED RELEASE TRANSDERMAL at 05:03

## 2024-10-02 RX ADMIN — Medication 10 ML: at 02:34

## 2024-10-02 RX ADMIN — BUMETANIDE 2 MG: 2 TABLET ORAL at 09:20

## 2024-10-02 NOTE — NURSING NOTE
"   10/02/24 0838   Wound 10/02/24 0236 Bilateral medial coccyx Pressure Injury   Placement Date/Time: 10/02/24 0236   Present on Original Admission: Yes  Side: Bilateral  Orientation: medial  Location: coccyx  Primary Wound Type: Pressure Injury   Pressure Injury Stage DTPI   Dressing Appearance open to air   Closure None   Base non-blanchable;purple   Periwound excoriated;redness;swelling   Periwound Temperature warm   Periwound Skin Turgor soft   Wound Length (cm) 2 cm   Wound Width (cm) 1 cm   Wound Surface Area (cm^2) 2 cm^2   Skin Interventions   Pressure Reduction Devices specialty bed utilized  (Accumax pump)   Pressure Reduction Techniques heels elevated off bed;positioned off wounds   Colostomy LLQ   No Placement date: If unknown, DO NOT use \"Add Comment\" note or Placement time: If unknown, DO NOT use \"Add Comment\" note found.   Location: LLQ   Stomal Appliance 2 piece;Clean;Dry;Intact;Drainable   Stoma Appearance round;moist;red   Peristomal Assessment BRIANA     CWCN: Consult received regarding skin issue on Coccyx area and Ostomy care. Patient alert, able to repositioning and standing to use bedside commode with assistance. Upon assessment we could see purple discoloration, slow blanching on Coccyx, DTI POA evolving into an open blister progressing toward soft tissue on buttocks, Calmoseptine ordered.  About Colostomy, he don't remember when the ostomy pouch was changed,  but refuted  to change it today, he stated when needed will let us know. Ostomy appliance remains intact, no leaking was noted, Felipa 2-piece 2 1/4 in on placed, supplies left in room.  Bilateral Heesl are with intact skin and normal coloration, protective padding should be used to facilitate cushioning, they must remain off-loaded at all the time.  Wound care order and pressure ulcer preventives  measures have been implemented into Epic.   Please add Accumax pump  for pressure relief.  Re-consult for any additional needs.       "

## 2024-10-02 NOTE — DISCHARGE SUMMARY
Date of Admission: 10/1/2024  Date of Discharge:  10/2/2024  Primary Care Physician: Luis Slaughter MD     Discharge Diagnosis:  Active Hospital Problems    Diagnosis  POA    **Acute dyspnea [R06.00]  Yes    Syncope and collapse [R55]  Unknown    Bradycardia [R00.1]  Yes    CHF (congestive heart failure) [I50.9]  Yes    CKD (chronic kidney disease) [N18.9]  Yes    Type 2 diabetes mellitus [E11.9]  Yes    Paroxysmal atrial fibrillation [I48.0]  Yes    Colostomy in place [Z93.3]  Not Applicable      Resolved Hospital Problems   No resolved problems to display.       DETAILS OF HOSPITAL STAY     Hospital Course  Patient was admitted overnight last night.  Please see my H&P from this morning for full details    Physical Exam at Discharge:  General: No acute distress, AAOx3  HEENT: EOMI, PERRL  Cardiovascular: +s1 and s2, RRR  Lungs: No rhonchi or wheezing  Abdomen: soft, nontender    Consults:   Consults       Date and Time Order Name Status Description    10/1/2024 11:01 PM Inpatient Cardiology Consult Completed     10/1/2024 10:23 PM [facility] (on-call MD unless specified) Details      9/1/2024 11:57 AM Inpatient Cardiology Consult Completed               Condition on Discharge: Stable    Discharge Disposition  Home or Self Care    Discharge Medications     Discharge Medications        Continue These Medications        Instructions Start Date   bumetanide 2 MG tablet  Commonly known as: BUMEX   2 mg, Oral, Daily      finasteride 5 MG tablet  Commonly known as: PROSCAR   5 mg, Oral, Daily      gabapentin 300 MG capsule  Commonly known as: NEURONTIN   300 mg, Oral, 3 Times Daily      rivaroxaban 15 MG tablet  Commonly known as: XARELTO   15 mg, Oral, Daily With Dinner      Scopolamine 1 MG/3DAYS patch   1 patch, Transdermal, Every 72 Hours      tiZANidine 4 MG tablet  Commonly known as: ZANAFLEX   4 mg, Oral, Nightly PRN      traMADol 50 MG tablet  Commonly known as: ULTRAM   50 mg, Oral, Every 8 Hours PRN                Discharge Diet:   Diet Instructions       Diet: Regular/House Diet, Cardiac Diets; Healthy Heart (2-3 Na+); Regular (IDDSI 7); Thin (IDDSI 0)      Discharge Diet:  Regular/House Diet  Cardiac Diets       Cardiac Diet: Healthy Heart (2-3 Na+)    Texture: Regular (IDDSI 7)    Fluid Consistency: Thin (IDDSI 0)            Activity at Discharge:   Activity Instructions       Activity as Tolerated              Follow-up Appointments  Future Appointments   Date Time Provider Department [place]   10/11/2024 10:30 AM Stuart Delgado MD [id number] None     Additional Instructions for the Follow-ups that You Need to Schedule       Discharge Follow-up with PCP   As directed       Currently Documented PCP:    Luis Slaughter MD    PCP Phone Number:    [phone]     Follow Up Details: 1 week                Test Results Pending at Discharge      I have examined and discussed discharge planning with the patient today.    Quincy Morales MD  10/02/24  13:10 EDT    Time: Discharge greater than 30 min

## 2024-10-02 NOTE — NURSING NOTE
Patient discharged in stable condition with girlfriend to transport home. Discharge paperwork reviewed with patient and girlfriend at bedside and all questions answered. IV and telemetry monitor removed. Box for loop recorder at bedside to go home.

## 2024-10-02 NOTE — PLAN OF CARE
Goal Outcome Evaluation:      Patient is a 79 y.o. male admitted to [facility] on 10/1/2024 for acute dyspnea and COPD exacerbation. PMHx includes CKD and CHF. Patient is ambulatory at baseline and lives alone in apartment though his S.O. lives down the caal and can assist as needed. Today, patient performed bed mobility with SBA, required CGA for transfers, and ambulated 75' CGA with a RW. Mild strength and balance deficits noted. Patient may benefit from skilled PT services acutely to address functional deficits as well as improve level of independence prior to discharge. Anticipate returning home with possible HH PT upon DC.      Anticipated Discharge Disposition (PT): home with assist, home with home health

## 2024-10-02 NOTE — CONSULTS
Electrophysiology Hospital Consult            Patient Name: Huber Valdovinos  Age/Sex: 79 y.o. male  : 1945  MRN: [id number]    Date of Admission: 10/1/2024  Date of Encounter Visit: 10/02/24  Encounter Provider: BETI Wyatt  Referring Provider: No ref. provider found  Place of Service: [facility] CARDIOLOGY  Patient Care Team:  Luis Slaughter MD as PCP - General (Family Medicine)      Subjective:   EP Consultation for:     Chief Complaint:     History of Present Illness:  Huber Valdovinos is a 79 y.o. male who [facility]. He has history of CKD, PAF, HFpEF.      [facility] follows with [facility] for [facility]. Told us he does not see a cardiologist.      He presented to ED with generalized weakness on .      HR noted to be in the 40s.      Suspected junctional. He denied any dizziness or syncope but generally felt unwell.      Dr. Eason saw him. His HR resolved and was in the 70-80s.     We saw him. He said even if he needed a pacemaker he would not want it. Since his bradycardia resolved we recommended a monitor at discharge.     Subsequent monitor was normal. Avg. HR of 60 bpm. No symptoms.         He presented to ED last night with complaints of dyspnea for one week.     He told nurses in the ED that he has experienced several episodes of syncope. He is not sure if he lost consciousness.     On arrival he has been in NSR with rates in the 60-70s. There has been no bradycardia.     He denies any syncope since admission.     He says most if not all of these episodes occur shortly after he stands up.     Despite being in NSR and HR in the 60s he states he still does not feel well.     He is fixated on his abdomen and some abdominal pain that he would like looked into.     Dr. Delgado and I evaluated him at bedside.     Past Medical History:  Past Medical History:   Diagnosis Date    Anal fissure     Anemia     BPH (benign prostatic hyperplasia)      Chronic pain     CKD (chronic kidney disease)     Cryoglobulinemia     Degeneration of cervical intervertebral disc     Diverticulitis     Episodic cluster headache     GERD (gastroesophageal reflux disease)     Hemorrhage of gastrointestinal tract     Hypothyroidism     Legal blindness     Macular degeneration     Non-healing surgical wound     Obesity     Osteoarthritis     Panic attacks     Paroxysmal atrial fibrillation     Personal history of alcoholism     Post-thoracotomy pain     Seborrhea     Tobacco user     Urethral stricture     Ventral hernia        Past Surgical History:   Procedure Laterality Date    ABDOMINAL SURGERY      CARDIAC CATHETERIZATION      COLON SURGERY      COLONOSCOPY      GASTRECTOMY      HERNIA REPAIR      JOINT REPLACEMENT         Home Medications:   Medications Prior to Admission   Medication Sig Dispense Refill Last Dose    bumetanide (BUMEX) 2 MG tablet Take 1 tablet by mouth Daily.       finasteride (PROSCAR) 5 MG tablet Take 1 tablet by mouth Daily.       gabapentin (NEURONTIN) 300 MG capsule Take 1 capsule by mouth 3 (Three) Times a Day.       rivaroxaban (XARELTO) 15 MG tablet Take 1 tablet by mouth Daily With Dinner.       tiZANidine (ZANAFLEX) 4 MG tablet Take 1 tablet by mouth At Night As Needed for Muscle Spasms.       traMADol (ULTRAM) 50 MG tablet Take 1 tablet by mouth Every 8 (Eight) Hours As Needed for Moderate Pain.       Scopolamine 1 MG/3DAYS patch Place 1 patch on the skin as directed by provider Every 72 (Seventy-Two) Hours.          Allergies:  Allergies   Allergen Reactions    Eliquis [Apixaban] Itching    Oxycodone Itching       Past Social History:  Social History     Socioeconomic History    Marital status: Single   Tobacco Use    Smoking status: Every Day     Current packs/day: 0.50     Types: Cigarettes    Smokeless tobacco: Never   Vaping Use    Vaping status: Never Used   Substance and Sexual Activity    Alcohol use: Never    Drug use: Never       Past  Family History:  History reviewed. No pertinent family history.    Review of Systems: All systems reviewed. Pertinent positives identified in HPI. All other systems are negative.     14 point ROS was performed and is negative except as outlined in HPI.     Objective:     Objective:  Vital Signs (last 24 hours)         10/01 0700  10/02 0659 10/02 0700  10/02 0841   Most Recent      Temp (°F) 98.4 -  99.1      98     98 (36.7) 10/02 0758    Heart Rate 69 -  84      70     70 10/02 0758    Resp 18 -  36      18     18 10/02 0758    /51 -  160/80      92/50     92/50 10/02 0758    SpO2 (%) 95 -  100      98     98 10/02 0758          Temp:  [98 °F (36.7 °C)-99.1 °F (37.3 °C)] 98 °F (36.7 °C)  Heart Rate:  [69-84] 70  Resp:  [18-36] 18  BP: ()/(48-80) 92/50  Body mass index is 27.52 kg/m².        Physical Exam:     General Appearance: No acute distress, well developed and well nourished.   Eyes: Conjunctiva and lids: No erythema, swelling, or discharge. Sclera non-icteric.   HENT: Atraumatic, normocephalic. External eyes, ears, and nose normal.   Respiratory: No signs of respiratory distress. Respiration rhythm and depth normal.   Clear to auscultation. No rales, crackles, rhonchi, or wheezing auscultated.   Cardiovascular:  Heart Rate and Rhythm: Normal, Heart Sounds: Normal S1 and S2. No S3 or S4 noted.  Gastrointestinal:  Abdomen soft, non-distended, non-tender.  Musculoskeletal: Normal movement of extremities  Skin: Warm and dry.   Psychiatric: Patient alert and oriented to person, place, and time. Speech and behavior appropriate. Normal mood and affect.    Labs:   Lab Review:     Results from last 7 days   Lab Units 10/02/24  0355 10/01/24  1939   SODIUM mmol/L 139 142   POTASSIUM mmol/L 3.6 3.8   CHLORIDE mmol/L 98 98   CO2 mmol/L 24.6 25.0   BUN mg/dL 34* 37*   CREATININE mg/dL 2.39* 2.57*   GLUCOSE mg/dL 108* 106*   CALCIUM mg/dL 9.7 10.0   AST (SGOT) U/L 29 31   ALT (SGPT) U/L 16 14     Results from  last 7 days   Lab Units 10/02/24  0355 10/01/24  2149 10/01/24  1939   HSTROP T ng/L 111* 122* 114*     Results from last 7 days   Lab Units 10/02/24  0355   WBC 10*3/mm3 7.63   HEMOGLOBIN g/dL 8.5*   HEMATOCRIT % 26.6*   PLATELETS 10*3/mm3 332                     Results from last 7 days   Lab Units 10/01/24  1939   PROBNP pg/mL 2,365.0*                     I personally viewed and interpreted the patient's EKG/Telemetry tracings.    Assessment:       Acute dyspnea    CHF (congestive heart failure)    CKD (chronic kidney disease)    Type 2 diabetes mellitus    Paroxysmal atrial fibrillation    Colostomy in place    Bradycardia        Plan:   Dr. Delgado and I saw this patient.       He has a history of bradycardia but no clear associated symptoms. He just wore a monitor that was normal and no evidence of bradycardia this admission.     Patient is concerned about syncope which sounds more orthostatic based on his history. There has been no correlation with bradycardia.           We discussed various options and have elected to place loop recorder and see if we can correlate symptoms with bradycardia. We discussed risks, benefits, and alternatives.       Thank you for allowing me to participate in the care of Huber Valdovinos. Feel free to contact me directly with any further questions or concerns.    BETI Wyatt  [facility]  10/02/24  08:41 EDT

## 2024-10-02 NOTE — ED NOTES
Nursing report ED to floor  Huber Valdovinos  79 y.o.  male    HPI :  HPI (Adult)  Stated Reason for Visit: shortness of air  History Obtained From: EMS  Duration (Weeks): 1    Chief Complaint  Chief Complaint   Patient presents with    Shortness of Breath       Admitting doctor:   Uday Kumar MD    Admitting diagnosis:   The primary encounter diagnosis was Acute dyspnea. Diagnoses of Chronic kidney disease, unspecified CKD stage, Chronic anemia, Acute exacerbation of chronic obstructive pulmonary disease (COPD), Elevated troponin, Chronic congestive heart failure, unspecified heart failure type, and Anxiety were also pertinent to this visit.    Code status:   Current Code Status       Date Active Code Status Order ID Comments User Context       10/1/2024 2301 CPR (Attempt to Resuscitate) [id number]  Yaneth Brito APRN ED        Question Answer    Code Status (Patient has no pulse and is not breathing) CPR (Attempt to Resuscitate)    Medical Interventions (Patient has pulse or is breathing) Full Support                    Allergies:   Oxycodone    Isolation:   No active isolations    Intake and Output  No intake or output data in the 24 hours ending 10/01/24 2313    Weight:   There were no vitals filed for this visit.    Most recent vitals:   Vitals:    10/01/24 2036 10/01/24 2102 10/01/24 2106 10/01/24 2234   BP: 117/48  111/51    BP Location:       Patient Position:       Pulse: 73 71 73 69   Resp:       Temp:       SpO2: 96% 97% 98%        Active LDAs/IV Access:   Lines, Drains & Airways       Active LDAs       Name Placement date Placement time Site Days    Peripheral IV 09/01/24 0707 Forearm 09/01/24  0707  Forearm  30    Peripheral IV 10/01/24 1921 Anterior;Left Forearm 10/01/24  1921  Forearm  less than 1    Colostomy LLQ --  --  LLQ  --                    Labs (abnormal labs have a star):   Labs Reviewed   COMPREHENSIVE METABOLIC PANEL - Abnormal; Notable for the following components:        Result Value    Glucose 106 (*)     BUN 37 (*)     Creatinine 2.57 (*)     Alkaline Phosphatase 132 (*)     Anion Gap 19.0 (*)     eGFR 25.0 (*)     All other components within normal limits    Narrative:     GFR Normal >60  Chronic Kidney Disease <60  Kidney Failure <15    The GFR formula is only valid for adults with stable renal function between ages 18 and 70.   BNP (IN-HOUSE) - Abnormal; Notable for the following components:    proBNP 2,365.0 (*)     All other components within normal limits    Narrative:     This assay is used as an aid in the diagnosis of individuals suspected of having heart failure. It can be used as an aid in the diagnosis of acute decompensated heart failure (ADHF) in patients presenting with signs and symptoms of ADHF to the emergency department (ED). In addition, NT-proBNP of <300 pg/mL indicates ADHF is not likely.    Age Range Result Interpretation  NT-proBNP Concentration (pg/mL:      <50             Positive            >450                   Gray                 300-450                    Negative             <300    50-75           Positive            >900                  Gray                300-900                  Negative            <300      >75             Positive            >1800                  Gray                300-1800                  Negative            <300   TROPONIN - Abnormal; Notable for the following components:    HS Troponin T 114 (*)     All other components within normal limits    Narrative:     High Sensitive Troponin T Reference Range:  <14.0 ng/L- Negative Female for AMI  <22.0 ng/L- Negative Male for AMI  >=14 - Abnormal Female indicating possible myocardial injury.  >=22 - Abnormal Male indicating possible myocardial injury.   Clinicians would have to utilize clinical acumen, EKG, Troponin, and serial changes to determine if it is an Acute Myocardial Infarction or myocardial injury due to an underlying chronic condition.        CBC WITH AUTO  DIFFERENTIAL - Abnormal; Notable for the following components:    RBC 3.44 (*)     Hemoglobin 9.8 (*)     Hematocrit 29.3 (*)     RDW 23.0 (*)     RDW-SD 67.8 (*)     Neutrophil % 77.0 (*)     Lymphocyte % 12.9 (*)     Immature Grans % 0.8 (*)     Immature Grans, Absolute 0.06 (*)     All other components within normal limits   HIGH SENSITIVITIY TROPONIN T 2HR - Abnormal; Notable for the following components:    HS Troponin T 122 (*)     Troponin T Delta 8 (*)     All other components within normal limits    Narrative:     High Sensitive Troponin T Reference Range:  <14.0 ng/L- Negative Female for AMI  <22.0 ng/L- Negative Male for AMI  >=14 - Abnormal Female indicating possible myocardial injury.  >=22 - Abnormal Male indicating possible myocardial injury.   Clinicians would have to utilize clinical acumen, EKG, Troponin, and serial changes to determine if it is an Acute Myocardial Infarction or myocardial injury due to an underlying chronic condition.        URINALYSIS W/ MICROSCOPIC IF INDICATED (NO CULTURE)   CBC (NO DIFF)   COMPREHENSIVE METABOLIC PANEL   TROPONIN   POCT GLUCOSE FINGERSTICK   POCT GLUCOSE FINGERSTICK   POCT GLUCOSE FINGERSTICK   POCT GLUCOSE FINGERSTICK   CBC AND DIFFERENTIAL    Narrative:     The following orders were created for panel order CBC & Differential.  Procedure                               Abnormality         Status                     ---------                               -----------         ------                     CBC Auto Differential[[id number]]        Abnormal            Final result                 Please view results for these tests on the individual orders.       EKG:   ECG 12 Lead Dyspnea   Preliminary Result   HEART RATE=88  bpm   RR Nbwkzaux=263  ms   AL Rtgclrtm=439  ms   P Horizontal Axis=  deg   P Front Axis=14  deg   QRSD Fwllplne=225  ms   QT Cehqrrox=062  ms   AGjA=515  ms   QRS Axis=-44  deg   T Wave Axis=116  deg   - ABNORMAL ECG -   Sinus rhythm    Nonspecific IVCD with LAD   Abnormal T, consider ischemia, lateral leads   ST elevation, consider inferior injury   Date and Time of Study:2024-10-01 19:44:22          Meds given in ED:   Medications   nitroglycerin (NITROSTAT) SL tablet 0.4 mg (has no administration in time range)   sodium chloride 0.9 % flush 10 mL (has no administration in time range)   sodium chloride 0.9 % flush 10 mL (has no administration in time range)   sodium chloride 0.9 % infusion 40 mL (has no administration in time range)   acetaminophen (TYLENOL) tablet 650 mg (has no administration in time range)     Or   acetaminophen (TYLENOL) 160 MG/5ML oral solution 650 mg (has no administration in time range)     Or   acetaminophen (TYLENOL) suppository 650 mg (has no administration in time range)   famotidine (PEPCID) tablet 20 mg (has no administration in time range)   sennosides-docusate (PERICOLACE) 8.6-50 MG per tablet 2 tablet (has no administration in time range)     And   polyethylene glycol (MIRALAX) packet 17 g (has no administration in time range)     And   bisacodyl (DULCOLAX) EC tablet 5 mg (has no administration in time range)     And   bisacodyl (DULCOLAX) suppository 10 mg (has no administration in time range)   ondansetron (ZOFRAN) injection 4 mg (has no administration in time range)   melatonin tablet 5 mg (has no administration in time range)   dextrose (GLUTOSE) oral gel 15 g (has no administration in time range)   dextrose (D50W) (25 g/50 mL) IV injection 25 g (has no administration in time range)   glucagon (GLUCAGEN) injection 1 mg (has no administration in time range)   insulin lispro (HUMALOG/ADMELOG) injection 2-7 Units (has no administration in time range)   albuterol (PROVENTIL) nebulizer solution 0.083% 2.5 mg/3mL (2.5 mg Nebulization Given 10/1/24 2015)       Imaging results:  XR Chest 1 View    Result Date: 10/1/2024  As described.  This report was finalized on 10/1/2024 8:52 PM by Dr. Juan Baez M.D on  Workstation: [id number]       Ambulatory status:   -     Social issues:   Social History     Socioeconomic History    Marital status: Single   Tobacco Use    Smoking status: Every Day     Current packs/day: 0.50     Types: Cigarettes    Smokeless tobacco: Never   Vaping Use    Vaping status: Never Used   Substance and Sexual Activity    Alcohol use: Never    Drug use: Never       Peripheral Neurovascular  Peripheral Neurovascular (Adult)  Peripheral Neurovascular WDL: WDL  LLE Neurovascular Assessment  Temperature LLE: hot  Color LLE: red  RLE Neurovascular Assessment  Temperature RLE: hot  Color RLE: red    Neuro Cognitive  Neuro Cognitive (Adult)  Cognitive/Neuro/Behavioral WDL: WDL    Learning  Learning Assessment (Adult)  Learning Readiness and Ability: no barriers identified  Education Provided  Person Taught: patient  Teaching Method: verbal instruction    Respiratory  Respiratory WDL  Respiratory WDL: WDL  Breath Sounds  Breath Sounds: All Fields  All Lung Fields Breath Sounds: Anterior:, diminished    Abdominal Pain       Pain Assessments  Pain (Adult)  (0-10) Pain Rating: Rest: 10  (0-10) Pain Rating: Activity: 10  Pain Location: abdomen  Pain Side/Orientation: bilateral  Pain Description: intermittent    NIH Stroke Scale       Hilton Valverde RN  10/01/24 23:13 EDT

## 2024-10-02 NOTE — PLAN OF CARE
Goal Outcome Evaluation:  Plan of Care Reviewed With: patient           Outcome Evaluation: Pt is a 78 y/o M admitted with acute dyspnea. Hx of macular degeneration/legally blind and COPD. Pt lives alone in an apt and is indep with ADLs and fxl mobility using a cane vs RW. Today pt requires SBA for bed mobility, CGA for STS and fxl ambulation around room/in hallway using RW. Some cues provided for navigation/safety d/t visual deficit. Pt reports being indep with changing ostomy. Transport arrives to take pt to cath lab. OT will follow-up as needed. Recommend d/c home with assist as needed      Anticipated Discharge Disposition (OT): home with assist

## 2024-10-02 NOTE — H&P
[facility] Admission H&P    Patient Care Team:  Luis Slaughter MD as PCP - General (Family Medicine)    Chief complaint: Shortness of breath, syncope    History of Present Illness    This is a 79-year-old male who presented to the emergency room with complaints of dyspnea and reported syncope however he is very vague and cannot provide any specific details regarding these events.  He says most of the events occur after he stands up.  He has a history of bradycardia but none observed during this admission.  He was evaluated by cardiology this morning who noted that he had a recent Zio patch which was unremarkable.  They have placed a loop recorder today.  Patient is extremely eager to be discharged on the hospital.  He had no acute overnight events.  His other chronic medical conditions appear stable.  [facility].    Past Medical History:   Diagnosis Date    Anal fissure     Anemia     BPH (benign prostatic hyperplasia)     Chronic pain     CKD (chronic kidney disease)     Cryoglobulinemia     Degeneration of cervical intervertebral disc     Diverticulitis     Episodic cluster headache     GERD (gastroesophageal reflux disease)     Hemorrhage of gastrointestinal tract     Hypothyroidism     Legal blindness     Macular degeneration     Non-healing surgical wound     Obesity     Osteoarthritis     Panic attacks     Paroxysmal atrial fibrillation     Personal history of alcoholism     Post-thoracotomy pain     Seborrhea     Tobacco user     Urethral stricture     Ventral hernia      Past Surgical History:   Procedure Laterality Date    ABDOMINAL SURGERY      CARDIAC CATHETERIZATION      COLON SURGERY      COLONOSCOPY      GASTRECTOMY      HERNIA REPAIR      JOINT REPLACEMENT       History reviewed. No pertinent family history.  Social History     Tobacco Use    Smoking status: Every Day     Current packs/day: 0.50     Types: Cigarettes    Smokeless tobacco: Never    Vaping Use    Vaping status: Never Used   Substance Use Topics    Alcohol use: Never    Drug use: Never     Medications Prior to Admission   Medication Sig Dispense Refill Last Dose    bumetanide (BUMEX) 2 MG tablet Take 1 tablet by mouth Daily.       finasteride (PROSCAR) 5 MG tablet Take 1 tablet by mouth Daily.       gabapentin (NEURONTIN) 300 MG capsule Take 1 capsule by mouth 3 (Three) Times a Day.       rivaroxaban (XARELTO) 15 MG tablet Take 1 tablet by mouth Daily With Dinner.       tiZANidine (ZANAFLEX) 4 MG tablet Take 1 tablet by mouth At Night As Needed for Muscle Spasms.       traMADol (ULTRAM) 50 MG tablet Take 1 tablet by mouth Every 8 (Eight) Hours As Needed for Moderate Pain.       Scopolamine 1 MG/3DAYS patch Place 1 patch on the skin as directed by provider Every 72 (Seventy-Two) Hours.        Allergies:  Eliquis [apixaban] and Oxycodone    Review of Systems   Constitutional:  Negative for chills and fever.   HENT:  Negative for congestion and sore throat.    Eyes:  Negative for visual disturbance.   Respiratory:  Negative for cough, chest tightness, shortness of breath and wheezing.    Cardiovascular:  Negative for chest pain, palpitations and leg swelling.   Gastrointestinal:  Negative for abdominal distention, abdominal pain, diarrhea, nausea and vomiting.   Endocrine: Negative for polydipsia and polyuria.   Genitourinary:  Negative for difficulty urinating, dysuria, frequency and urgency.   Musculoskeletal:  Negative for arthralgias and myalgias.   Skin:  Negative for color change and rash.   Neurological:  Negative for dizziness and light-headedness.        PHYSICAL EXAM    Vital Signs  tMax 24 hrs:  Temp (24hrs), Av.5 °F (36.9 °C), Min:98 °F (36.7 °C), Max:99.1 °F (37.3 °C)    Vitals Ranges:  Temp:  [98 °F (36.7 °C)-99.1 °F (37.3 °C)] 98 °F (36.7 °C)  Heart Rate:  [69-84] 83  Resp:  [13-36] 24  BP: ()/(48-91) 118/71    Physical Exam  Vitals and nursing note reviewed.    Constitutional:       General: He is not in acute distress.     Appearance: He is well-developed. He is not ill-appearing.   HENT:      Head: Normocephalic and atraumatic.      Nose: Nose normal.      Mouth/Throat:      Mouth: Mucous membranes are not dry.   Eyes:      Conjunctiva/sclera: Conjunctivae normal.      Pupils: Pupils are equal, round, and reactive to light.   Neck:      Vascular: No JVD.   Cardiovascular:      Rate and Rhythm: Normal rate and regular rhythm.      Heart sounds: No murmur heard.     No gallop.   Pulmonary:      Effort: Pulmonary effort is normal. No accessory muscle usage or respiratory distress.      Breath sounds: No decreased breath sounds or wheezing.   Abdominal:      General: Bowel sounds are normal. There is no distension.      Palpations: Abdomen is soft.      Tenderness: There is no abdominal tenderness. There is no guarding or rebound.   Musculoskeletal:         General: No deformity. Normal range of motion.      Cervical back: Normal range of motion and neck supple.   Lymphadenopathy:      Cervical: No cervical adenopathy.   Skin:     General: Skin is warm and dry.      Findings: No erythema or rash.   Neurological:      Mental Status: He is alert and oriented to person, place, and time.      Cranial Nerves: No cranial nerve deficit.         Results Review:  Results from last 7 days   Lab Units 10/02/24  0355   WBC 10*3/mm3 7.63   HEMOGLOBIN g/dL 8.5*   HEMATOCRIT % 26.6*   PLATELETS 10*3/mm3 332     Results from last 7 days   Lab Units 10/02/24  0355   SODIUM mmol/L 139   POTASSIUM mmol/L 3.6   CHLORIDE mmol/L 98   CO2 mmol/L 24.6   BUN mg/dL 34*   CREATININE mg/dL 2.39*   CALCIUM mg/dL 9.7   BILIRUBIN mg/dL 0.8   ALK PHOS U/L 107   ALT (SGPT) U/L 16   AST (SGOT) U/L 29   GLUCOSE mg/dL 108*        I reviewed the patient's new clinical results.  I reviewed the patient's new imaging results and agree with the interpretation.        Active Hospital Problems    Diagnosis  POA     **Acute dyspnea [R06.00]  Yes    Syncope and collapse [R55]  Unknown    Bradycardia [R00.1]  Yes    CHF (congestive heart failure) [I50.9]  Yes    CKD (chronic kidney disease) [N18.9]  Yes    Type 2 diabetes mellitus [E11.9]  Yes    Paroxysmal atrial fibrillation [I48.0]  Yes    Colostomy in place [Z93.3]  Not Applicable      Resolved Hospital Problems   No resolved problems to display.       Assessment & Plan    The patient was admitted overnight last night for complaints of shortness of breath, syncope, and bradycardia.  No bradycardia has been observed so far.  Cardiology evaluated him and placed a loop recorder this morning.  Patient is adamant about being discharged today and does not want to undergo any further testing.  I encouraged him to go back and see his primary care physician promptly.  He will be released    I discussed the patients findings and my recommendations with patient      Quincy Morales MD  10/02/24  11:09 EDT

## 2024-10-02 NOTE — PLAN OF CARE
"Goal Outcome Evaluation:         Pt A&Ox4, R/A, SR w/PVCs, Q2hr turns, up with x1 assist to BSC to turn/pivot; urine hesitancy when standing or when with female staff.  Male NA transferred pt to [facility] and pt was able to void 600 mL while sitting.  Oral hydration promoted, PRN muscle relaxer for spasms and R sided abdominal pain.  Pt says he has two broken ribs that he can feel but a CXR was done in ER and does not indicate this.  Pt was offered PRN pain medication but refused.  Scopolamine patch placed behind R ear.  Pt able to take short cat naps.  Pt states his macular degeneration has caused bilat eye blindness except shadows but also says he is having visual hallucinations and was able to see a clear drink being poured into a cup so uncertain how much visual aid is needed but offering assist where appropriate.  LLQ ostomy with flatus but no stool output this shift - pt was asked how long ago LBM was and he said it has been \"weeks\".  Stage 1 on bilat buttocks and two stage 2s right next to each other on coccyx.  Added wound care nurse consult.  Pt very unsteady on feet and has edema and bruising especially bruised on L hip area.  Barrier cream and pillow support provided - accumax pump not available but has been requested from distribution.       Problem: Adult Inpatient Plan of Care  Goal: Plan of Care Review  Outcome: Progressing  Goal: Optimal Comfort and Wellbeing  Outcome: Progressing  Intervention: Monitor Pain and Promote Comfort  Description: Assess pain level, treatment efficacy and patient response at regular intervals using a consistent pain scale.  Consider the presence and impact of preexisting chronic pain.  Encourage patient and caregiver involvement in pain assessment, interventions and safety measures.  Recent Flowsheet Documentation  Taken 10/2/2024 0202 by Mariam Santiago, RN  Pain Management Interventions:   care clustered   diversional activity provided   position adjusted   pillow support " provided   quiet environment facilitated  Intervention: Provide Person-Centered Care  Description: Use a family-focused approach to care.  Develop trust and rapport by proactively providing information, encouraging questions, addressing concerns and offering reassurance.  Acknowledge emotional response to hospitalization.  Recognize and utilize personal coping strategies.  Honor spiritual and cultural preferences.  Recent Flowsheet Documentation  Taken 10/2/2024 0202 by Mariam Santiago, RN  Trust Relationship/Rapport:   care explained   choices provided   empathic listening provided   questions answered   thoughts/feelings acknowledged   reassurance provided   questions encouraged  Goal: Readiness for Transition of Care  Outcome: Progressing  Intervention: Mutually Develop Transition Plan  Description: Identify available resources for support (e.g., family, friends, community).  Identify and address barriers to ongoing treatment and home management (e.g., environmental, financial).  Provide opportunities to practice self-management skills.  Assess and monitor emotional readiness for transition.  Establish or reconnect linkage with outpatient providers or community-based services.  Recent Flowsheet Documentation  Taken 10/2/2024 0219 by Mariam Santiago, RN  Transportation Anticipated: family or friend will provide  Patient/Family Anticipated Services at Transition:   rehabilitation services   home health care  Patient/Family Anticipates Transition to: home with family  Taken 10/2/2024 0218 by Mraiam Santiago, RN  Equipment Currently Used at Home: power chair,(recliner lift)  Taken 10/2/2024 0215 by Mariam Santiago, RN  Equipment Currently Used at Home:   cane, straight   walker, rolling     Problem: Skin Injury Risk Increased  Goal: Skin Health and Integrity  Outcome: Progressing  Intervention: Promote and Optimize Oral Intake  Description: Perform a nutrition assessment; include a nutrition-focused physical  exam.  Determine calorie, protein, vitamin, mineral and fluid requirements.  Assess for micronutrient deficiencies; supplement if depleted.  Assess need and assist with meal set-up and feeding.  Adjust diet or meal schedule based on preferences and tolerance.  Offer oral supplemental food or drinks to enhance calorie and protein intake.  Establish bowel elimination program to increase comfort and appetite.  Minimize unnecessary dietary restrictions to increase oral intake.  Provide and encourage oral hygiene to enhance desire to eat.  Consider enteral nutrition support if oral intake remains inadequate; provide parenteral nutrition if enteral is contraindicated.  Recent Flowsheet Documentation  Taken 10/2/2024 0202 by Mariam Santiago, RN  Oral Nutrition Promotion:   rest periods promoted   safe use of adaptive equipment encouraged  Intervention: Optimize Skin Protection  Description: Perform a full pressure injury risk assessment, as indicated by screening, upon admission to care unit.  Reassess skin (injury risk, full inspection) frequently (e.g., scheduled interval, with change in condition) to provide optimal early detection and prevention.  Maintain adequate tissue perfusion (e.g., encourage fluid balance; avoid crossing legs, constrictive clothing or devices) to promote tissue oxygenation.  Maintain head of bed at lowest degree of elevation tolerated, considering medical condition and other restrictions.  Avoid positioning onto an area that remains reddened.  Minimize incontinence and moisture (e.g., toileting schedule; moisture-wicking pad, diaper or incontinence collection device; skin moisture barrier).  Cleanse skin promptly and gently when soiled utilizing a pH-balanced cleanser.  Relieve and redistribute pressure (e.g., scheduled position changes, weight shifts, use of support surface, medical device repositioning, protective dressing application, use of positioning device, microclimate control, use of  pressure-injury-monitor  Encourage increased activity, such as sitting in a chair at the bedside or early mobilization, when able to tolerate.  Recent Flowsheet Documentation  Taken 10/2/2024 0600 by Mariam Santiago RN  Head of Bed (HOB) Positioning: HOB elevated  Taken 10/2/2024 0400 by Mariam Santiago RN  Head of Bed (HOB) Positioning: HOB elevated  Taken 10/2/2024 0202 by Mariam Santiago RN  Pressure Reduction Techniques:   frequent weight shift encouraged   weight shift assistance provided  Head of Bed (HOB) Positioning: HOB at 20-30 degrees  Pressure Reduction Devices:   positioning supports utilized   pressure-redistributing mattress utilized  Skin Protection:   adhesive use limited   incontinence pads utilized   tubing/devices free from skin contact   transparent dressing maintained     Problem: COPD (Chronic Obstructive Pulmonary Disease) Comorbidity  Goal: Maintenance of COPD Symptom Control  Outcome: Progressing  Intervention: Maintain COPD-Symptom Control  Description: Evaluate adherence to management plan (e.g., medication, trigger avoidance, infection prevention, self-monitoring).  Advocate for continuation of home regimen, including medication, method of delivery, schedule and symptom monitoring.  Anticipate the need for breathing techniques and activity pacing to minimize fatigue and breathlessness.  Assess for proper use of inhaled medication and delivery technique; assist or reinstruct if needed.  Evaluate effectiveness of coping skills; encourage expression of feelings, expectations and concerns related to disease management and quality of life; reinforce education to enhance management plan and wellbeing.  Recent Flowsheet Documentation  Taken 10/2/2024 0600 by Mariam Santiago RN  Medication Review/Management:   medications reviewed   high-risk medications identified  Taken 10/2/2024 0400 by Mariam Santiago RN  Medication Review/Management:   medications reviewed   high-risk medications  identified  Taken 10/2/2024 0202 by Mariam Santiago RN  Supportive Measures:   active listening utilized   verbalization of feelings encouraged  Medication Review/Management:   medications reviewed   high-risk medications identified   provider consulted     Problem: Heart Failure Comorbidity  Goal: Maintenance of Heart Failure Symptom Control  Outcome: Progressing  Intervention: Maintain Heart Failure-Management  Description: Evaluate adherence to home heart failure self-care regimen (e.g., medication, fluid balance, sodium intake, daily weight, physical activity, telemonitoring, support).  Advocate continuation of home medication and schedule.  Consider pharmacologic therapy administration time and effects (e.g., avoid giving diuretic prior to bedtime or nitrates on empty stomach).  Monitor response to pharmacologic therapy, including weight fluctuations, blood pressure and electrolyte levels.  Monitor for signs and symptoms of anxiety and depression, including severity and duration; if present, provide psychosocial support.  Consider need for heart failure clinic or palliative care consult.  Recent Flowsheet Documentation  Taken 10/2/2024 0600 by Mariam Santiago, RN  Medication Review/Management:   medications reviewed   high-risk medications identified  Taken 10/2/2024 0400 by Mariam Santiago RN  Medication Review/Management:   medications reviewed   high-risk medications identified  Taken 10/2/2024 0202 by Mariam Santiago RN  Medication Review/Management:   medications reviewed   high-risk medications identified   provider consulted     Problem: Hypertension Comorbidity  Goal: Blood Pressure in Desired Range  Outcome: Progressing  Intervention: Maintain Blood Pressure Management  Description: Evaluate adherence to home antihypertensive regimen (e.g., exercise and activity, diet modification, medication).  Provide scheduled antihypertensive medication; consider administration time and effects (e.g., avoid  giving diuretic prior to bedtime).  Monitor response to antihypertensive medication therapy (e.g., blood pressure, electrolyte levels, medication effects).  Minimize risk of orthostatic hypotension; encourage caution with position changes, particularly if elderly.  Recent Flowsheet Documentation  Taken 10/2/2024 0600 by Mariam Santiago, RN  Medication Review/Management:   medications reviewed   high-risk medications identified  Taken 10/2/2024 0400 by Mariam Santiago, RN  Medication Review/Management:   medications reviewed   high-risk medications identified  Taken 10/2/2024 0202 by Mariam Santiago RN  Syncope Management:   position changed slowly   legs elevated  Medication Review/Management:   medications reviewed   high-risk medications identified   provider consulted     Problem: Fall Injury Risk  Goal: Absence of Fall and Fall-Related Injury  Outcome: Progressing  Intervention: Identify and Manage Contributors  Description: Develop a fall prevention plan with the patient and caregiver/family.  Provide reorientation, appropriate sensory stimulation and routines with changes in mental status to decrease risk of fall.  Promote use of personal vision and auditory aids.  Assess assistance level required for safe and effective self-care; provide support as needed, such as toileting, mobilization. For age 65 and older, implement timed toileting with assistance.  Encourage physical activity, such as performance of mobility and self-care at highest level of patient ability, multicomponent exercise program and provision of appropriate assistive devices.  If fall occurs, assess the severity of injury; implement fall injury protocol. Determine the cause and revise fall injury prevention plan.  Regularly review medication contribution to fall risk; adjust medication administration times to minimize risk of falling.  Consider risk related to polypharmacy and age.  Balance adequate pain management with potential for  oversedation.  Recent Flowsheet Documentation  Taken 10/2/2024 0600 by Mariam Santiago, RN  Medication Review/Management:   medications reviewed   high-risk medications identified  Taken 10/2/2024 0400 by Mariam Santiago RN  Medication Review/Management:   medications reviewed   high-risk medications identified  Taken 10/2/2024 0202 by Mariam Santiago RN  Medication Review/Management:   medications reviewed   high-risk medications identified   provider consulted  Intervention: Promote Injury-Free Environment  Description: Provide a safe, barrier-free environment that encourages independent activity.  Keep care area uncluttered and well-lighted.  Determine need for increased observation or monitoring.  Avoid use of devices that minimize mobility, such as restraints or indwelling urinary catheter.  Recent Flowsheet Documentation  Taken 10/2/2024 0600 by Mariam Santiago, RN  Safety Promotion/Fall Prevention: safety round/check completed  Taken 10/2/2024 0400 by Mariam Santiago, RN  Safety Promotion/Fall Prevention: safety round/check completed  Taken 10/2/2024 0202 by Mariam Santiago, RN  Safety Promotion/Fall Prevention: safety round/check completed

## 2024-10-02 NOTE — THERAPY EVALUATION
Patient Name: Huber Valdovinos  : 1945    MRN: [id number]                              Today's Date: 10/2/2024       Admit Date: 10/1/2024    Visit Dx:     ICD-10-CM ICD-9-CM   1. Syncope and collapse  R55 780.2   2. Chronic kidney disease, unspecified CKD stage  N18.9 585.9   3. Chronic anemia  D64.9 285.9   4. Acute exacerbation of chronic obstructive pulmonary disease (COPD)  J44.1 491.21   5. Elevated troponin  R79.89 790.6   6. Chronic congestive heart failure, unspecified heart failure type  I50.9 428.0   7. Acute dyspnea  R06.00 786.09   8. Anxiety  F41.9 300.00     Patient Active Problem List   Diagnosis    Generalized weakness    CHF (congestive heart failure)    CKD (chronic kidney disease)    Type 2 diabetes mellitus    GERD (gastroesophageal reflux disease)    Paroxysmal atrial fibrillation    Colostomy in place    Macular degeneration    Acute UTI (urinary tract infection)    Bradycardia    Acute dyspnea    Syncope and collapse     Past Medical History:   Diagnosis Date    Anal fissure     Anemia     BPH (benign prostatic hyperplasia)     Chronic pain     CKD (chronic kidney disease)     Cryoglobulinemia     Degeneration of cervical intervertebral disc     Diverticulitis     Episodic cluster headache     GERD (gastroesophageal reflux disease)     Hemorrhage of gastrointestinal tract     Hypothyroidism     Legal blindness     Macular degeneration     Non-healing surgical wound     Obesity     Osteoarthritis     Panic attacks     Paroxysmal atrial fibrillation     Personal history of alcoholism     Post-thoracotomy pain     Seborrhea     Tobacco user     Urethral stricture     Ventral hernia      Past Surgical History:   Procedure Laterality Date    ABDOMINAL SURGERY      CARDIAC CATHETERIZATION      COLON SURGERY      COLONOSCOPY      GASTRECTOMY      HERNIA REPAIR      JOINT REPLACEMENT        General Information       Row Name 10/02/24 1106          Physical Therapy Time and Intention     Document Type evaluation  -MS     Mode of Treatment co-treatment;physical therapy;occupational therapy;other (see comments)  -MS       Row Name 10/02/24 1106          General Information    Patient Profile Reviewed yes  -MS     Prior Level of Function independent:;all household mobility  apartment, S.O. lives down the caal, access to cane and RW, no recent falls  -MS     Existing Precautions/Restrictions fall  baseline visual deficits d/t macular degeneration.  -MS     Barriers to Rehab visual deficit;hearing deficit  -MS       Row Name 10/02/24 1106          Living Environment    People in Home alone  -MS       Row Name 10/02/24 1106          Home Main Entrance    Number of Stairs, Main Entrance none  -MS       Row Name 10/02/24 1106          Stairs Within Home, Primary    Number of Stairs, Within Home, Primary none  -MS       Row Name 10/02/24 1106          Cognition    Orientation Status (Cognition) oriented x 4  -MS       Row Name 10/02/24 1106          Safety Issues, Functional Mobility    Safety Issues Affecting Function (Mobility) insight into deficits/self-awareness;judgment;positioning of assistive device  -MS     Impairments Affecting Function (Mobility) strength;endurance/activity tolerance;balance  -MS     Comment, Safety Issues/Impairments (Mobility) Gait belt and non skid socks donned.  -MS               User Key  (r) = Recorded By, (t) = Taken By, (c) = Cosigned By      Initials Name Provider Type    MS Nandini Shah, PT Physical Therapist                   Mobility       Row Name 10/02/24 1128          Bed Mobility    Bed Mobility supine-sit  -MS     Supine-Sit [employer] (Bed Mobility) standby assist;verbal cues  -MS       Row Name 10/02/24 1128          Sit-Stand Transfer    Sit-Stand [employer] (Transfers) contact guard;verbal cues  -MS     Assistive Device (Sit-Stand Transfers) walker, front-wheeled  -MS       Row Name 10/02/24 1128          Gait/Stairs (Locomotion)    [employer] Level  (Gait) contact guard;verbal cues  -MS     Assistive Device (Gait) walker, front-wheeled  -MS     Distance in Feet (Gait) 75  -MS     Deviations/Abnormal Patterns (Gait) christina decreased;gait speed decreased  -MS     Bilateral Gait Deviations forward flexed posture  -MS     Comment, (Gait/Stairs) Steady with RW. One LOB as patient voiced he thought he was seeing hallucinations? Able to self correct. Distance limited due to transport arriving.  -MS               User Key  (r) = Recorded By, (t) = Taken By, (c) = Cosigned By      Initials Name Provider Type    Nandini Shrestha, PT Physical Therapist                   Obj/Interventions       Row Name 10/02/24 1129          Range of Motion Comprehensive    Comment, General Range of Motion B LEs grossly WFL  -MS       Row Name 10/02/24 1129          Strength Comprehensive (MMT)    Comment, General Manual Muscle Testing (MMT) Assessment B LEs grossly 4/5  -MS       Row Name 10/02/24 1129          Balance    Balance Assessment sitting static balance;standing static balance  -MS     Static Sitting Balance standby assist  -MS     Position, Sitting Balance sitting edge of bed  -MS     Static Standing Balance standby assist;contact guard  -MS     Position/Device Used, Standing Balance supported;walker, rolling  -MS       Row Name 10/02/24 1129          Sensory Assessment (Somatosensory)    Sensory Assessment (Somatosensory) sensation intact  -MS               User Key  (r) = Recorded By, (t) = Taken By, (c) = Cosigned By      Initials Name Provider Type    Nandini Shrestha, PT Physical Therapist                   Goals/Plan       Row Name 10/02/24 1132          Bed Mobility Goal 1 (PT)    Activity/Assistive Device (Bed Mobility Goal 1, PT) bed mobility activities, all  -MS     [place] Level/Cues Needed (Bed Mobility Goal 1, PT) supervision required  -MS     Time Frame (Bed Mobility Goal 1, PT) 1 week  -MS       Row Name 10/02/24 1132          Transfer Goal 1  (PT)    Activity/Assistive Device (Transfer Goal 1, PT) transfers, all  -MS     [place] Level/Cues Needed (Transfer Goal 1, PT) supervision required  -MS     Time Frame (Transfer Goal 1, PT) 1 week  -MS       Row Name 10/02/24 1132          Gait Training Goal 1 (PT)    Activity/Assistive Device (Gait Training Goal 1, PT) gait (walking locomotion)  -MS     [place] Level (Gait Training Goal 1, PT) supervision required  -MS     Distance (Gait Training Goal 1, PT) 150  -MS     Time Frame (Gait Training Goal 1, PT) 1 week  -MS       Row Name 10/02/24 1132          Therapy Assessment/Plan (PT)    Planned Therapy Interventions (PT) balance training;bed mobility training;gait training;home exercise program;postural re-education;patient/family education;ROM (range of motion);stair training;strengthening;transfer training  -MS               User Key  (r) = Recorded By, (t) = Taken By, (c) = Cosigned By      Initials Name Provider Type    Nandini Shrestha, PT Physical Therapist                   Clinical Impression       Row Name 10/02/24 1131          Pain    Pretreatment Pain Rating 0/10 - no pain  -MS     Posttreatment Pain Rating 0/10 - no pain  -MS       Row Name 10/02/24 1131          Therapy Assessment/Plan (PT)    Rehab Potential (PT) good, to achieve stated therapy goals  -MS     Criteria for Skilled Interventions Met (PT) yes;meets criteria  -MS     Therapy Frequency (PT) 5 times/wk  -MS       Row Name 10/02/24 1131          Vital Signs    O2 Delivery Pre Treatment room air  -MS       Row Name 10/02/24 1131          Positioning and Restraints    Pre-Treatment Position in bed  -MS     Post Treatment Position other  -MS     Other Position with other staff  w transporter on stretcher.  -MS               User Key  (r) = Recorded By, (t) = Taken By, (c) = Cosigned By      Initials Name Provider Type    Nandini Shrestha, PT Physical Therapist                   Outcome Measures       Row Name 10/02/24  1132 10/02/24 0217       How much help from another person do you currently need...    Turning from your back to your side while in flat bed without using bedrails? 4  -MS 4  -CW    Moving from lying on back to sitting on the side of a flat bed without bedrails? 3  -MS 4  -CW    Moving to and from a bed to a chair (including a wheelchair)? 3  -MS 3  -CW    Standing up from a chair using your arms (e.g., wheelchair, bedside chair)? 3  -MS 3  -CW    Climbing 3-5 steps with a railing? 3  -MS 1  -CW    To walk in hospital room? 3  -MS 3  -CW    AM-PAC 6 Clicks Score (PT) 19  -MS 18  -CW    Highest Level of Mobility Goal 6 --> Walk 10 steps or more  -MS 6 --> Walk 10 steps or more  -CW              User Key  (r) = Recorded By, (t) = Taken By, (c) = Cosigned By      Initials Name Provider Type    Nandini Shrestha PT Physical Therapist    CW Mariam Santiago RN Registered Nurse                                   PT Recommendation and Plan  Planned Therapy Interventions (PT): balance training, bed mobility training, gait training, home exercise program, postural re-education, patient/family education, ROM (range of motion), stair training, strengthening, transfer training        Time Calculation:         PT Charges       Row Name 10/02/24 1105             Time Calculation    Start Time 0925  -MS      Stop Time 0941  -MS      Time Calculation (min) 16 min  -MS      PT Received On 10/02/24  -MS      PT - Next Appointment 10/03/24  -MS      PT Goal Re-Cert Due Date 10/09/24  -MS                User Key  (r) = Recorded By, (t) = Taken By, (c) = Cosigned By      Initials Name Provider Type    Nandini Shrestha PT Physical Therapist                  Therapy Charges for Today       Code Description Service Date Service Provider Modifiers Qty    29511703510 HC PT EVAL MOD COMPLEXITY 3 10/2/2024 Nandini Shah, PT GP 1    96181369738 HC PT THER PROC EA 15 MIN 10/2/2024 Nandini Shah, PT GP 1            PT  G-Codes  AM-PAC 6 Clicks Score (PT): 19  PT Discharge Summary  Anticipated Discharge Disposition (PT): home with assist, home with home health    Nandini Shah, PT  10/2/2024

## 2024-10-02 NOTE — NURSING NOTE
Santo Brito APRN to report pt's admission information input and meds reviewed with pt.  Pt is requesting his home medication Tramadol and Zanaflex which he takes at home normally.

## 2024-10-02 NOTE — THERAPY EVALUATION
Patient Name: Huber Valdovinos  : 1945    MRN: [id number]                              Today's Date: 10/2/2024       Admit Date: 10/1/2024    Visit Dx:     ICD-10-CM ICD-9-CM   1. Syncope and collapse  R55 780.2   2. Chronic kidney disease, unspecified CKD stage  N18.9 585.9   3. Chronic anemia  D64.9 285.9   4. Acute exacerbation of chronic obstructive pulmonary disease (COPD)  J44.1 491.21   5. Elevated troponin  R79.89 790.6   6. Chronic congestive heart failure, unspecified heart failure type  I50.9 428.0   7. Acute dyspnea  R06.00 786.09   8. Anxiety  F41.9 300.00     Patient Active Problem List   Diagnosis    Generalized weakness    CHF (congestive heart failure)    CKD (chronic kidney disease)    Type 2 diabetes mellitus    GERD (gastroesophageal reflux disease)    Paroxysmal atrial fibrillation    Colostomy in place    Macular degeneration    Acute UTI (urinary tract infection)    Bradycardia    Acute dyspnea    Syncope and collapse     Past Medical History:   Diagnosis Date    Anal fissure     Anemia     BPH (benign prostatic hyperplasia)     Chronic pain     CKD (chronic kidney disease)     Cryoglobulinemia     Degeneration of cervical intervertebral disc     Diverticulitis     Episodic cluster headache     GERD (gastroesophageal reflux disease)     Hemorrhage of gastrointestinal tract     Hypothyroidism     Legal blindness     Macular degeneration     Non-healing surgical wound     Obesity     Osteoarthritis     Panic attacks     Paroxysmal atrial fibrillation     Personal history of alcoholism     Post-thoracotomy pain     Seborrhea     Tobacco user     Urethral stricture     Ventral hernia      Past Surgical History:   Procedure Laterality Date    ABDOMINAL SURGERY      CARDIAC CATHETERIZATION      COLON SURGERY      COLONOSCOPY      GASTRECTOMY      HERNIA REPAIR      JOINT REPLACEMENT        General Information       Row Name 10/02/24 1129          OT Time and Intention    Document Type  evaluation  -     Mode of Treatment occupational therapy;co-treatment  -       Row Name 10/02/24 1129          General Information    Patient Profile Reviewed yes  -     Prior Level of Function independent:;ADL's;all household mobility  reports being indep with ADLs, fxl mobility using RW/cane. Denies recent falls. Changes ostomy bag independently  -     Existing Precautions/Restrictions fall  -     Barriers to Rehab visual deficit  visual deficit d/t macular degeneration  -       Row Name 10/02/24 1129          Living Environment    People in Home alone  s/o lives down the caal of apt complex  -       Row Name 10/02/24 1129          Home Main Entrance    Number of Stairs, Main Entrance none  -       Row Name 10/02/24 1129          Cognition    Orientation Status (Cognition) oriented x 4  -       Row Name 10/02/24 1129          Safety Issues, Functional Mobility    Safety Issues Affecting Function (Mobility) insight into deficits/self-awareness;judgment  -     Impairments Affecting Function (Mobility) strength;endurance/activity tolerance;visual/perceptual  -     Comment, Safety Issues/Impairments (Mobility) Pt is co-tx appropriate d/t decreased activity tolerance and to maximize pt participation and safety with functional activity.  -               User Key  (r) = Recorded By, (t) = Taken By, (c) = Cosigned By      Initials Name Provider Type     Elle Fisher OT Occupational Therapist                     Mobility/ADL's       Row Name 10/02/24 1133          Bed Mobility    Bed Mobility supine-sit  -     Supine-Sit Clark (Bed Mobility) standby assist;verbal cues  -       Row Name 10/02/24 1133          Transfers    Transfers sit-stand transfer  -       Row Name 10/02/24 1133          Sit-Stand Transfer    Sit-Stand Clark (Transfers) contact guard;verbal cues  -     Assistive Device (Sit-Stand Transfers) walker, front-wheeled  -       Row Name 10/02/24 1133           Functional Mobility    Functional Mobility- Comment CGA using RW, cues for navigation d/t visual deficit  -[facility] Name 10/02/24 1133          Activities of Daily Living    BADL Assessment/Intervention toileting  anticipate set-up/spv for LB ADLs/toileting, indep with UB ADLs.  -[facility] Name 10/02/24 1133          Toileting Assessment/Training    Comment, (Toileting) indep with ostomy changes  -               User Key  (r) = Recorded By, (t) = Taken By, (c) = Cosigned By      Initials Name Provider Type    Elle Sherwood OT Occupational Therapist                   Obj/Interventions       [facility] Name 10/02/24 1137          Vision Assessment/Intervention    Vision Assessment Comment macular degeneration, some cues for safety navigation  -[facility] Name 10/02/24 1137          Range of Motion Comprehensive    General Range of Motion bilateral upper extremity ROM WNL  -[facility] Name 10/02/24 1137          Strength Comprehensive (MMT)    Comment, General Manual Muscle Testing (MMT) Assessment UE 4+/5 grossly  -[facility] Name 10/02/24 1137          Balance    Static Sitting Balance supervision  -     Position, Sitting Balance sitting edge of bed  -     Static Standing Balance standby assist;contact guard  -               User Key  (r) = Recorded By, (t) = Taken By, (c) = Cosigned By      Initials Name Provider Type    Elle Sherwood OT Occupational Therapist                   Goals/Plan       [facility] Name 10/02/24 1153          Transfer Goal 1 (OT)    Activity/Assistive Device (Transfer Goal 1, OT) transfers, all  -     [place] Level/Cues Needed (Transfer Goal 1, OT) modified independence  -     Time Frame (Transfer Goal 1, OT) 2 weeks;short term goal (STG)  -     Progress/Outcome (Transfer Goal 1, OT) goal ongoing  -[facility] Name 10/02/24 115          Dressing Goal 1 (OT)    Activity/Device (Dressing Goal 1, OT) dressing skills, all  -     [place]/Cues Needed (Dressing Goal 1,  OT) independent  -JW     Time Frame (Dressing Goal 1, OT) short term goal (STG);2 weeks  -JW     Progress/Outcome (Dressing Goal 1, OT) goal ongoing  -       Row Name 10/02/24 1154          Toileting Goal 1 (OT)    Activity/Device (Toileting Goal 1, OT) toileting skills, all  -JW     [place] Level/Cues Needed (Toileting Goal 1, OT) independent  -JW     Time Frame (Toileting Goal 1, OT) short term goal (STG);2 weeks  -JW     Progress/Outcome (Toileting Goal 1, OT) goal ongoing  -       Row Name 10/02/24 1154          Grooming Goal 1 (OT)    Activity/Device (Grooming Goal 1, OT) grooming skills, all  -JW     Yell (Grooming Goal 1, OT) independent  -JW     Time Frame (Grooming Goal 1, OT) short term goal (STG);2 weeks  -JW     Progress/Outcome (Grooming Goal 1, OT) goal ongoing  -       Row Name 10/02/24 1154          Therapy Assessment/Plan (OT)    Planned Therapy Interventions (OT) activity tolerance training;functional balance retraining;occupation/activity based interventions;BADL retraining;patient/caregiver education/training;transfer/mobility retraining  -               User Key  (r) = Recorded By, (t) = Taken By, (c) = Cosigned By      Initials Name Provider Type    Elle Sherwood OT Occupational Therapist                   Clinical Impression       Row Name 10/02/24 1140          Pain Assessment    Pretreatment Pain Rating 0/10 - no pain  -     Posttreatment Pain Rating 0/10 - no pain  -       Row Name 10/02/24 1140          Plan of Care Review    Plan of Care Reviewed With patient  -     Outcome Evaluation Pt is a 78 y/o M admitted with acute dyspnea. Hx of macular degeneration/legally blind and COPD. Pt lives alone in an apt and is indep with ADLs and fxl mobility using a cane vs RW. Today pt requires SBA for bed mobility, CGA for STS and fxl ambulation around room/in hallway using RW. Some cues provided for navigation/safety d/t visual deficit. Pt reports being indep with  changing ostomy. Transport arrives to take pt to cath lab. OT will follow-up as needed. Recommend d/c home with assist as needed  -       Row Name 10/02/24 1140          Therapy Assessment/Plan (OT)    Rehab Potential (OT) good, to achieve stated therapy goals  -     Criteria for Skilled Therapeutic Interventions Met (OT) yes;skilled treatment is necessary  -     Therapy Frequency (OT) 3 times/wk  -       Row Name 10/02/24 1140          Therapy Plan Review/Discharge Plan (OT)    Anticipated Discharge Disposition (OT) home with assist  -       Row Name 10/02/24 1140          Positioning and Restraints    Pre-Treatment Position in bed  -JW     Post Treatment Position other  -JW     Other Position with other staff  transport  -               User Key  (r) = Recorded By, (t) = Taken By, (c) = Cosigned By      Initials Name Provider Type    Elle Sherwood, BRICE Occupational Therapist                   Outcome Measures       Row Name 10/02/24 1154          How much help from another is currently needed...    Putting on and taking off regular lower body clothing? 3  -JW     Bathing (including washing, rinsing, and drying) 3  -JW     Toileting (which includes using toilet bed pan or urinal) 3  -JW     Putting on and taking off regular upper body clothing 3  -JW     Taking care of personal grooming (such as brushing teeth) 3  -JW     Eating meals 4  -JW     AM-PAC 6 Clicks Score (OT) 19  -       Row Name 10/02/24 1132 10/02/24 0217       How much help from another person do you currently need...    Turning from your back to your side while in flat bed without using bedrails? 4  -MS 4  -CW    Moving from lying on back to sitting on the side of a flat bed without bedrails? 3  -MS 4  -CW    Moving to and from a bed to a chair (including a wheelchair)? 3  -MS 3  -CW    Standing up from a chair using your arms (e.g., wheelchair, bedside chair)? 3  -MS 3  -CW    Climbing 3-5 steps with a railing? 3  -MS 1  -CW    To  walk in hospital room? 3  -MS 3  -CW    AM-PAC 6 Clicks Score (PT) 19  -MS 18  -CW    Highest Level of Mobility Goal 6 --> Walk 10 steps or more  -MS 6 --> Walk 10 steps or more  -CW      Row Name 10/02/24 1154          Functional Assessment    Outcome Measure Options AM-PAC 6 Clicks Daily Activity (OT)  -KARRIE               User Key  (r) = Recorded By, (t) = Taken By, (c) = Cosigned By      Initials Name Provider Type    Nandini Shrestha REYNOLD, PT Physical Therapist    Mariam Reveles, RN Registered Nurse    Elle Sherwood OT Occupational Therapist                    Occupational Therapy Education       Title: PT OT SLP Therapies (Done)       Topic: Occupational Therapy (Done)       Point: ADL training (Done)       Description:   Instruct learner(s) on proper safety adaptation and remediation techniques during self care or transfers.   Instruct in proper use of assistive devices.                  Learning Progress Summary             Patient Acceptance, E, VU by KARRIE at 10/2/2024 1156                         Point: Home exercise program (Done)       Description:   Instruct learner(s) on appropriate technique for monitoring, assisting and/or progressing therapeutic exercises/activities.                  Learning Progress Summary             Patient Acceptance, E, VU by KARRIE at 10/2/2024 1156                         Point: Precautions (Done)       Description:   Instruct learner(s) on prescribed precautions during self-care and functional transfers.                  Learning Progress Summary             Patient Acceptance, E, VU by KARRIE at 10/2/2024 1156                         Point: Body mechanics (Done)       Description:   Instruct learner(s) on proper positioning and spine alignment during self-care, functional mobility activities and/or exercises.                  Learning Progress Summary             Patient Acceptance, E, VU by KARRIE at 10/2/2024 1156                                         User Key       Initials  Effective Dates Name Provider Type Discipline     06/10/21 -  Elle Fisher OT Occupational Therapist OT                  OT Recommendation and Plan  Planned Therapy Interventions (OT): activity tolerance training, functional balance retraining, occupation/activity based interventions, BADL retraining, patient/caregiver education/training, transfer/mobility retraining  Therapy Frequency (OT): 3 times/wk  Plan of Care Review  Plan of Care Reviewed With: patient  Outcome Evaluation: Pt is a 80 y/o M admitted with acute dyspnea. Hx of macular degeneration/legally blind and COPD. Pt lives alone in an apt and is indep with ADLs and fxl mobility using a cane vs RW. Today pt requires SBA for bed mobility, CGA for STS and fxl ambulation around room/in hallway using RW. Some cues provided for navigation/safety d/t visual deficit. Pt reports being indep with changing ostomy. Transport arrives to take pt to cath lab. OT will follow-up as needed. Recommend d/c home with assist as needed     Time Calculation:   Evaluation Complexity (OT)  Review Occupational Profile/Medical/Therapy History Complexity: brief/low complexity  Assessment, Occupational Performance/Identification of Deficit Complexity: 1-3 performance deficits  Clinical Decision Making Complexity (OT): problem focused assessment/low complexity  Overall Complexity of Evaluation (OT): low complexity     Time Calculation- OT       Row Name 10/02/24 1156             Time Calculation- OT    OT Start Time 0925  -      OT Stop Time 0940  -      OT Time Calculation (min) 15 min  -      Total Timed Code Minutes- OT 8 minute(s)  -      OT Received On 10/02/24  -      OT - Next Appointment 10/03/24  -      OT Goal Re-Cert Due Date 10/16/24  -         Timed Charges    03529 - OT Therapeutic Activity Minutes 8  -         Untimed Charges    OT Eval/Re-eval Minutes 7  -         Total Minutes    Timed Charges Total Minutes 8  -      Untimed Charges Total Minutes  7  -JW       Total Minutes 15  -JW                User Key  (r) = Recorded By, (t) = Taken By, (c) = Cosigned By      Initials Name Provider Type    Elle Sherwood OT Occupational Therapist                  Therapy Charges for Today       Code Description Service Date Service Provider Modifiers Qty    [id number]  OT THERAPEUTIC ACT EA 15 MIN 10/2/2024 Elle Fisher OT GO 1    [id number]  OT EVAL LOW COMPLEXITY 3 10/2/2024 Elle Fisher OT GO 1                 Elle Fisher OT  10/2/2024

## 2024-10-03 NOTE — CASE MANAGEMENT/SOCIAL WORK
Case Management Discharge Note      Final Note: home no needs         Selected Continued Care - Discharged on 10/2/2024 Admission date: 10/1/2024 - Discharge disposition: Home or Self Care      Destination    No services have been selected for the patient.                Durable Medical Equipment    No services have been selected for the patient.                Dialysis/Infusion    No services have been selected for the patient.                Home Medical Care    No services have been selected for the patient.                Therapy    No services have been selected for the patient.                Community Resources    No services have been selected for the patient.                Community & DME    No services have been selected for the patient.                    Transportation Services  Private: Car    Final Discharge Disposition Code: 01 - home or self-care

## 2024-10-03 NOTE — OUTREACH NOTE
Prep Survey      Flowsheet Row Responses   [facility] patient discharged from? [place]   Is LACE score < 7 ? No   Eligibility Readm Mgmt   Discharge diagnosis Acute dyspnea   Does the patient have one of the following disease processes/diagnoses(primary or secondary)? Other   Does the patient have Home health ordered? No   Is there a DME ordered? No   Prep survey completed? Yes            Miesha DURON - Registered Nurse

## 2024-10-07 ENCOUNTER — READMISSION MANAGEMENT (OUTPATIENT)
Dept: CALL CENTER | Facility: HOSPITAL | Age: 79
End: 2024-10-07
Payer: OTHER GOVERNMENT

## 2024-10-07 NOTE — OUTREACH NOTE
Medical Week 1 Survey      Flowsheet Row Responses   [facility] patient discharged from? [place]   Does the patient have one of the following disease processes/diagnoses(primary or secondary)? Other   Week 1 attempt successful? Yes   Call start time 0824   Call end time 0826   List who call center can speak with pt   Medication alerts for this patient No changes to medication list.   Comments regarding appointments Pt to see pcp on 10-.   Does the patient have a primary care provider?  Yes   Has the patient kept scheduled appointments due by today? N/A   Has home health visited the patient within 72 hours of discharge? N/A   Psychosocial issues? No   What is the patient's perception of their health status since discharge? Improving   Week 1 call completed? Yes   Would this patient benefit from a Referral to Amb Social Work? No   Is the patient interested in additional calls from an ambulatory ? No   Graduated/Revoked comments Brief call as to pt reports feeling well. No changes to medication list. Pt to see cardiology this week. Pt to see pcp on 10-.   Call end time 0826            Eileen VACA - Registered Nurse

## 2024-10-16 ENCOUNTER — READMISSION MANAGEMENT (OUTPATIENT)
Dept: CALL CENTER | Facility: HOSPITAL | Age: 79
End: 2024-10-16
Payer: OTHER GOVERNMENT

## 2024-10-16 NOTE — OUTREACH NOTE
Medical Week 2 Survey      Flowsheet Row Responses   [facility] [place] patient discharged from? [place]   Does the patient have one of the following disease processes/diagnoses(primary or secondary)? Other   Week 2 attempt successful? No   Unsuccessful attempts Attempt 1  [All numbers listed were attempted-no answer]            Kendra H - Registered Nurse

## 2024-10-21 PROCEDURE — 93298 REM INTERROG DEV EVAL SCRMS: CPT | Performed by: STUDENT IN AN ORGANIZED HEALTH CARE EDUCATION/TRAINING PROGRAM

## 2024-12-05 PROCEDURE — 93298 REM INTERROG DEV EVAL SCRMS: CPT | Performed by: STUDENT IN AN ORGANIZED HEALTH CARE EDUCATION/TRAINING PROGRAM

## 2025-01-17 ENCOUNTER — INPATIENT HOSPITAL (OUTPATIENT)
Dept: URBAN - METROPOLITAN AREA HOSPITAL 107 | Facility: HOSPITAL | Age: 80
End: 2025-01-17
Payer: OTHER GOVERNMENT

## 2025-01-17 DIAGNOSIS — D64.9 ANEMIA, UNSPECIFIED: ICD-10-CM

## 2025-01-17 PROCEDURE — 99222 1ST HOSP IP/OBS MODERATE 55: CPT | Performed by: PHYSICIAN ASSISTANT

## 2025-01-18 ENCOUNTER — INPATIENT HOSPITAL (OUTPATIENT)
Dept: URBAN - METROPOLITAN AREA HOSPITAL 107 | Facility: HOSPITAL | Age: 80
End: 2025-01-18
Payer: OTHER GOVERNMENT

## 2025-01-18 DIAGNOSIS — R13.10 DYSPHAGIA, UNSPECIFIED: ICD-10-CM

## 2025-01-18 DIAGNOSIS — K22.2 ESOPHAGEAL OBSTRUCTION: ICD-10-CM

## 2025-01-18 DIAGNOSIS — T18.128A FOOD IN ESOPHAGUS CAUSING OTHER INJURY, INITIAL ENCOUNTER: ICD-10-CM

## 2025-01-18 DIAGNOSIS — K22.5 DIVERTICULUM OF ESOPHAGUS, ACQUIRED: ICD-10-CM

## 2025-01-18 PROCEDURE — 43450 DILATE ESOPHAGUS 1/MULT PASS: CPT | Performed by: INTERNAL MEDICINE

## 2025-01-18 PROCEDURE — 43247 EGD REMOVE FOREIGN BODY: CPT | Performed by: INTERNAL MEDICINE

## (undated) DEVICE — LOU LOOP RECORDER PACK: Brand: MEDLINE INDUSTRIES, INC.